# Patient Record
Sex: FEMALE | Race: WHITE | NOT HISPANIC OR LATINO | Employment: OTHER | URBAN - NONMETROPOLITAN AREA
[De-identification: names, ages, dates, MRNs, and addresses within clinical notes are randomized per-mention and may not be internally consistent; named-entity substitution may affect disease eponyms.]

---

## 2017-02-17 ENCOUNTER — HOSPITAL ENCOUNTER (INPATIENT)
Facility: HOSPITAL | Age: 67
LOS: 9 days | Discharge: HOME | End: 2017-02-26
Attending: INTERNAL MEDICINE | Admitting: INTERNAL MEDICINE

## 2017-02-17 PROBLEM — J44.1 ACUTE EXACERBATION OF CHRONIC OBSTRUCTIVE PULMONARY DISEASE (COPD) (HCC): Status: ACTIVE | Noted: 2017-02-17

## 2017-02-17 PROBLEM — F17.219 CIGARETTE NICOTINE DEPENDENCE WITH NICOTINE-INDUCED DISORDER: Status: ACTIVE | Noted: 2017-02-17

## 2017-02-17 PROBLEM — J96.02 ACUTE HYPERCAPNIC RESPIRATORY FAILURE (HCC): Status: ACTIVE | Noted: 2017-02-17

## 2017-02-17 PROBLEM — J96.21 ACUTE ON CHRONIC RESPIRATORY FAILURE WITH HYPOXIA (HCC): Status: ACTIVE | Noted: 2017-02-17

## 2017-02-17 PROBLEM — J18.9 PNEUMONIA OF RIGHT LOWER LOBE DUE TO INFECTIOUS ORGANISM: Status: ACTIVE | Noted: 2017-02-17

## 2017-02-17 LAB
ARTERIAL PATENCY WRIST A: POSITIVE
ATMOSPHERIC PRESS: 731 MMHG
BASE EXCESS BLDA CALC-SCNC: -3.8 MMOL/L
BDY SITE: ABNORMAL
CK MB SERPL-CCNC: 2.6 NG/ML (ref 0.2–2.4)
CK SERPL-CCNC: 32 U/L (ref 30–170)
FLUAV AG NPH QL: NEGATIVE
FLUBV AG NPH QL IA: NEGATIVE
HCO3 BLDA-SCNC: 21.1 MMOL/L (ref 22–28)
HGB BLDA-MCNC: 8.6 G/DL (ref 12–18)
HOROWITZ INDEX BLD+IHG-RTO: 50 %
MODALITY: ABNORMAL
PCO2 BLDA: 35 MM HG (ref 35–45)
PH BLDA: 7.39 PH UNITS
PO2 BLDA: 68.1 MM HG (ref 75–100)
SAO2 % BLDCOA: 95.1 %
TROPONIN I SERPL-MCNC: 0.05 NG/ML (ref 0–0.03)

## 2017-02-17 PROCEDURE — P9046 ALBUMIN (HUMAN), 25%, 20 ML: HCPCS | Performed by: INTERNAL MEDICINE

## 2017-02-17 PROCEDURE — 25010000002 ENOXAPARIN PER 10 MG: Performed by: INTERNAL MEDICINE

## 2017-02-17 PROCEDURE — 84484 ASSAY OF TROPONIN QUANT: CPT | Performed by: INTERNAL MEDICINE

## 2017-02-17 PROCEDURE — 82553 CREATINE MB FRACTION: CPT | Performed by: INTERNAL MEDICINE

## 2017-02-17 PROCEDURE — 87804 INFLUENZA ASSAY W/OPTIC: CPT | Performed by: FAMILY MEDICINE

## 2017-02-17 PROCEDURE — 5A09457 ASSISTANCE WITH RESPIRATORY VENTILATION, 24-96 CONSECUTIVE HOURS, CONTINUOUS POSITIVE AIRWAY PRESSURE: ICD-10-PCS | Performed by: INTERNAL MEDICINE

## 2017-02-17 PROCEDURE — 94799 UNLISTED PULMONARY SVC/PX: CPT

## 2017-02-17 PROCEDURE — 25010000002 METHYLPREDNISOLONE PER 40 MG: Performed by: INTERNAL MEDICINE

## 2017-02-17 PROCEDURE — 99223 1ST HOSP IP/OBS HIGH 75: CPT | Performed by: INTERNAL MEDICINE

## 2017-02-17 PROCEDURE — 87081 CULTURE SCREEN ONLY: CPT | Performed by: INTERNAL MEDICINE

## 2017-02-17 PROCEDURE — 25010000002 CEFTRIAXONE: Performed by: INTERNAL MEDICINE

## 2017-02-17 PROCEDURE — 36600 WITHDRAWAL OF ARTERIAL BLOOD: CPT

## 2017-02-17 PROCEDURE — 25010000002 AZITHROMYCIN: Performed by: INTERNAL MEDICINE

## 2017-02-17 PROCEDURE — 82805 BLOOD GASES W/O2 SATURATION: CPT

## 2017-02-17 PROCEDURE — 87040 BLOOD CULTURE FOR BACTERIA: CPT | Performed by: INTERNAL MEDICINE

## 2017-02-17 PROCEDURE — 82550 ASSAY OF CK (CPK): CPT | Performed by: INTERNAL MEDICINE

## 2017-02-17 PROCEDURE — 25010000002 DIGOXIN PER 500 MCG: Performed by: INTERNAL MEDICINE

## 2017-02-17 PROCEDURE — 94640 AIRWAY INHALATION TREATMENT: CPT

## 2017-02-17 PROCEDURE — 25010000002 ALBUMIN HUMAN 25% PER 50 ML: Performed by: INTERNAL MEDICINE

## 2017-02-17 PROCEDURE — 94660 CPAP INITIATION&MGMT: CPT

## 2017-02-17 PROCEDURE — 93005 ELECTROCARDIOGRAM TRACING: CPT | Performed by: INTERNAL MEDICINE

## 2017-02-17 RX ORDER — DOCUSATE SODIUM 100 MG/1
100 CAPSULE, LIQUID FILLED ORAL 2 TIMES DAILY
Status: DISCONTINUED | OUTPATIENT
Start: 2017-02-17 | End: 2017-02-26 | Stop reason: HOSPADM

## 2017-02-17 RX ORDER — IPRATROPIUM BROMIDE AND ALBUTEROL SULFATE 2.5; .5 MG/3ML; MG/3ML
3 SOLUTION RESPIRATORY (INHALATION)
Status: DISCONTINUED | OUTPATIENT
Start: 2017-02-17 | End: 2017-02-26 | Stop reason: HOSPADM

## 2017-02-17 RX ORDER — BUDESONIDE 0.5 MG/2ML
0.5 INHALANT ORAL
Status: DISCONTINUED | OUTPATIENT
Start: 2017-02-17 | End: 2017-02-18

## 2017-02-17 RX ORDER — ESCITALOPRAM OXALATE 20 MG/1
20 TABLET ORAL DAILY
Status: DISCONTINUED | OUTPATIENT
Start: 2017-02-17 | End: 2017-02-26 | Stop reason: HOSPADM

## 2017-02-17 RX ORDER — GUAIFENESIN 600 MG/1
600 TABLET, EXTENDED RELEASE ORAL 2 TIMES DAILY
Status: DISCONTINUED | OUTPATIENT
Start: 2017-02-17 | End: 2017-02-26 | Stop reason: HOSPADM

## 2017-02-17 RX ORDER — ACETAMINOPHEN 325 MG/1
650 TABLET ORAL EVERY 4 HOURS PRN
Status: DISCONTINUED | OUTPATIENT
Start: 2017-02-17 | End: 2017-02-26 | Stop reason: HOSPADM

## 2017-02-17 RX ORDER — ATENOLOL 50 MG/1
50 TABLET ORAL 2 TIMES DAILY
COMMUNITY

## 2017-02-17 RX ORDER — FLUTICASONE PROPIONATE 50 MCG
2 SPRAY, SUSPENSION (ML) NASAL DAILY
COMMUNITY

## 2017-02-17 RX ORDER — PRAVASTATIN SODIUM 40 MG
40 TABLET ORAL NIGHTLY
COMMUNITY

## 2017-02-17 RX ORDER — PRAVASTATIN SODIUM 20 MG
40 TABLET ORAL NIGHTLY
Status: DISCONTINUED | OUTPATIENT
Start: 2017-02-17 | End: 2017-02-26 | Stop reason: HOSPADM

## 2017-02-17 RX ORDER — DILTIAZEM HYDROCHLORIDE 5 MG/ML
INJECTION INTRAVENOUS
Status: COMPLETED
Start: 2017-02-17 | End: 2017-02-17

## 2017-02-17 RX ORDER — AMMONIUM LACTATE 12 G/100G
LOTION TOPICAL 2 TIMES DAILY PRN
Status: DISCONTINUED | OUTPATIENT
Start: 2017-02-17 | End: 2017-02-26 | Stop reason: HOSPADM

## 2017-02-17 RX ORDER — ECHINACEA PURPUREA EXTRACT 125 MG
1 TABLET ORAL AS NEEDED
Status: DISCONTINUED | OUTPATIENT
Start: 2017-02-17 | End: 2017-02-26 | Stop reason: HOSPADM

## 2017-02-17 RX ORDER — SENNOSIDES 8.6 MG
650 CAPSULE ORAL EVERY 8 HOURS PRN
COMMUNITY

## 2017-02-17 RX ORDER — ASPIRIN 81 MG/1
81 TABLET, CHEWABLE ORAL DAILY
Status: DISCONTINUED | OUTPATIENT
Start: 2017-02-17 | End: 2017-02-26 | Stop reason: HOSPADM

## 2017-02-17 RX ORDER — DIGOXIN 0.25 MG/ML
125 INJECTION INTRAMUSCULAR; INTRAVENOUS
Status: COMPLETED | OUTPATIENT
Start: 2017-02-17 | End: 2017-02-19

## 2017-02-17 RX ORDER — NICOTINE 21 MG/24HR
1 PATCH, TRANSDERMAL 24 HOURS TRANSDERMAL EVERY 24 HOURS
COMMUNITY

## 2017-02-17 RX ORDER — ONDANSETRON 2 MG/ML
4 INJECTION INTRAMUSCULAR; INTRAVENOUS EVERY 6 HOURS PRN
Status: DISCONTINUED | OUTPATIENT
Start: 2017-02-17 | End: 2017-02-26 | Stop reason: HOSPADM

## 2017-02-17 RX ORDER — NICOTINE 21 MG/24HR
1 PATCH, TRANSDERMAL 24 HOURS TRANSDERMAL EVERY 24 HOURS
Status: DISCONTINUED | OUTPATIENT
Start: 2017-02-17 | End: 2017-02-26 | Stop reason: HOSPADM

## 2017-02-17 RX ORDER — ATENOLOL 50 MG/1
50 TABLET ORAL 2 TIMES DAILY
Status: DISCONTINUED | OUTPATIENT
Start: 2017-02-17 | End: 2017-02-19

## 2017-02-17 RX ORDER — METHYLPREDNISOLONE SODIUM SUCCINATE 40 MG/ML
40 INJECTION, POWDER, LYOPHILIZED, FOR SOLUTION INTRAMUSCULAR; INTRAVENOUS EVERY 12 HOURS
Status: DISCONTINUED | OUTPATIENT
Start: 2017-02-17 | End: 2017-02-26 | Stop reason: HOSPADM

## 2017-02-17 RX ORDER — LORAZEPAM 0.5 MG/1
1 TABLET ORAL 2 TIMES DAILY
Status: DISCONTINUED | OUTPATIENT
Start: 2017-02-17 | End: 2017-02-26 | Stop reason: HOSPADM

## 2017-02-17 RX ORDER — DILTIAZEM HYDROCHLORIDE 5 MG/ML
10 INJECTION INTRAVENOUS ONCE
Status: DISCONTINUED | OUTPATIENT
Start: 2017-02-17 | End: 2017-02-19

## 2017-02-17 RX ORDER — LISINOPRIL 10 MG/1
10 TABLET ORAL DAILY
Status: DISCONTINUED | OUTPATIENT
Start: 2017-02-17 | End: 2017-02-26 | Stop reason: HOSPADM

## 2017-02-17 RX ORDER — ISOSORBIDE MONONITRATE 60 MG/1
60 TABLET, EXTENDED RELEASE ORAL DAILY
COMMUNITY
End: 2017-02-27

## 2017-02-17 RX ORDER — ONDANSETRON 4 MG/1
4 TABLET, ORALLY DISINTEGRATING ORAL EVERY 6 HOURS PRN
Status: DISCONTINUED | OUTPATIENT
Start: 2017-02-17 | End: 2017-02-26 | Stop reason: HOSPADM

## 2017-02-17 RX ORDER — LISINOPRIL 10 MG/1
10 TABLET ORAL DAILY
COMMUNITY

## 2017-02-17 RX ORDER — ONDANSETRON 4 MG/1
4 TABLET, FILM COATED ORAL EVERY 6 HOURS PRN
Status: DISCONTINUED | OUTPATIENT
Start: 2017-02-17 | End: 2017-02-26 | Stop reason: HOSPADM

## 2017-02-17 RX ORDER — IPRATROPIUM BROMIDE AND ALBUTEROL SULFATE 2.5; .5 MG/3ML; MG/3ML
3 SOLUTION RESPIRATORY (INHALATION) EVERY 4 HOURS PRN
Status: DISCONTINUED | OUTPATIENT
Start: 2017-02-17 | End: 2017-02-26 | Stop reason: HOSPADM

## 2017-02-17 RX ORDER — ALBUMIN (HUMAN) 12.5 G/50ML
12.5 SOLUTION INTRAVENOUS ONCE
Status: COMPLETED | OUTPATIENT
Start: 2017-02-17 | End: 2017-02-17

## 2017-02-17 RX ORDER — ESCITALOPRAM OXALATE 20 MG/1
20 TABLET ORAL DAILY
COMMUNITY

## 2017-02-17 RX ORDER — SODIUM CHLORIDE 0.9 % (FLUSH) 0.9 %
1-10 SYRINGE (ML) INJECTION AS NEEDED
Status: DISCONTINUED | OUTPATIENT
Start: 2017-02-17 | End: 2017-02-26 | Stop reason: HOSPADM

## 2017-02-17 RX ORDER — OMEPRAZOLE 20 MG/1
20 CAPSULE, DELAYED RELEASE ORAL DAILY
COMMUNITY

## 2017-02-17 RX ORDER — LORAZEPAM 1 MG/1
1 TABLET ORAL 2 TIMES DAILY
COMMUNITY
End: 2017-02-26 | Stop reason: HOSPADM

## 2017-02-17 RX ORDER — PANTOPRAZOLE SODIUM 40 MG/1
40 TABLET, DELAYED RELEASE ORAL
Status: DISCONTINUED | OUTPATIENT
Start: 2017-02-17 | End: 2017-02-26 | Stop reason: HOSPADM

## 2017-02-17 RX ORDER — BENZONATATE 100 MG/1
100 CAPSULE ORAL 3 TIMES DAILY PRN
Status: DISCONTINUED | OUTPATIENT
Start: 2017-02-17 | End: 2017-02-21

## 2017-02-17 RX ORDER — FLUTICASONE PROPIONATE 50 MCG
2 SPRAY, SUSPENSION (ML) NASAL DAILY
Status: DISCONTINUED | OUTPATIENT
Start: 2017-02-17 | End: 2017-02-26 | Stop reason: HOSPADM

## 2017-02-17 RX ORDER — BENZONATATE 100 MG/1
100 CAPSULE ORAL 3 TIMES DAILY PRN
COMMUNITY
End: 2017-02-27

## 2017-02-17 RX ORDER — IPRATROPIUM BROMIDE AND ALBUTEROL SULFATE 2.5; .5 MG/3ML; MG/3ML
3 SOLUTION RESPIRATORY (INHALATION) EVERY 4 HOURS PRN
COMMUNITY

## 2017-02-17 RX ORDER — DIGOXIN 0.25 MG/ML
125 INJECTION INTRAMUSCULAR; INTRAVENOUS ONCE
Status: COMPLETED | OUTPATIENT
Start: 2017-02-17 | End: 2017-02-17

## 2017-02-17 RX ORDER — ISOSORBIDE MONONITRATE 60 MG/1
60 TABLET, EXTENDED RELEASE ORAL DAILY
Status: DISCONTINUED | OUTPATIENT
Start: 2017-02-17 | End: 2017-02-18

## 2017-02-17 RX ORDER — ASPIRIN 81 MG/1
81 TABLET, CHEWABLE ORAL DAILY
COMMUNITY

## 2017-02-17 RX ORDER — SODIUM CHLORIDE 9 MG/ML
50 INJECTION, SOLUTION INTRAVENOUS CONTINUOUS
Status: DISCONTINUED | OUTPATIENT
Start: 2017-02-17 | End: 2017-02-20

## 2017-02-17 RX ADMIN — BUDESONIDE 0.5 MG: 0.5 INHALANT RESPIRATORY (INHALATION) at 21:54

## 2017-02-17 RX ADMIN — ALBUMIN HUMAN 12.5 G: 0.25 SOLUTION INTRAVENOUS at 16:04

## 2017-02-17 RX ADMIN — IPRATROPIUM BROMIDE AND ALBUTEROL SULFATE 3 ML: .5; 3 SOLUTION RESPIRATORY (INHALATION) at 18:06

## 2017-02-17 RX ADMIN — DILTIAZEM HYDROCHLORIDE 5 MG/HR: 5 INJECTION INTRAVENOUS at 13:37

## 2017-02-17 RX ADMIN — PRAVASTATIN SODIUM 40 MG: 20 TABLET ORAL at 21:01

## 2017-02-17 RX ADMIN — AZITHROMYCIN 500 MG: 500 INJECTION, POWDER, LYOPHILIZED, FOR SOLUTION INTRAVENOUS at 13:36

## 2017-02-17 RX ADMIN — METHYLPREDNISOLONE SODIUM SUCCINATE 40 MG: 40 INJECTION, POWDER, FOR SOLUTION INTRAMUSCULAR; INTRAVENOUS at 11:38

## 2017-02-17 RX ADMIN — FLUTICASONE PROPIONATE 2 SPRAY: 50 SPRAY, METERED NASAL at 11:41

## 2017-02-17 RX ADMIN — SODIUM CHLORIDE 100 ML/HR: 9 INJECTION, SOLUTION INTRAVENOUS at 11:02

## 2017-02-17 RX ADMIN — IPRATROPIUM BROMIDE AND ALBUTEROL SULFATE 3 ML: .5; 3 SOLUTION RESPIRATORY (INHALATION) at 21:55

## 2017-02-17 RX ADMIN — NICOTINE 1 PATCH: 14 PATCH TRANSDERMAL at 11:38

## 2017-02-17 RX ADMIN — GUAIFENESIN 600 MG: 600 TABLET, EXTENDED RELEASE ORAL at 11:38

## 2017-02-17 RX ADMIN — DOCUSATE SODIUM 100 MG: 100 CAPSULE, LIQUID FILLED ORAL at 17:12

## 2017-02-17 RX ADMIN — ATENOLOL 50 MG: 50 TABLET ORAL at 17:11

## 2017-02-17 RX ADMIN — ASPIRIN 81 MG 81 MG: 81 TABLET ORAL at 11:38

## 2017-02-17 RX ADMIN — ISOSORBIDE MONONITRATE 60 MG: 60 TABLET, EXTENDED RELEASE ORAL at 11:38

## 2017-02-17 RX ADMIN — DIGOXIN 125 MCG: 0.25 INJECTION INTRAMUSCULAR; INTRAVENOUS at 12:06

## 2017-02-17 RX ADMIN — DILTIAZEM HYDROCHLORIDE 30 MG: 30 TABLET, FILM COATED ORAL at 11:38

## 2017-02-17 RX ADMIN — ESCITALOPRAM OXALATE 20 MG: 20 TABLET ORAL at 11:38

## 2017-02-17 RX ADMIN — PANTOPRAZOLE SODIUM 40 MG: 40 TABLET, DELAYED RELEASE ORAL at 11:38

## 2017-02-17 RX ADMIN — DIGOXIN 125 MCG: 0.25 INJECTION INTRAMUSCULAR; INTRAVENOUS at 19:44

## 2017-02-17 RX ADMIN — GUAIFENESIN 600 MG: 600 TABLET, EXTENDED RELEASE ORAL at 17:12

## 2017-02-17 RX ADMIN — BUDESONIDE 0.5 MG: 0.5 INHALANT RESPIRATORY (INHALATION) at 18:06

## 2017-02-17 RX ADMIN — CEFTRIAXONE 1 G: 1 INJECTION, POWDER, FOR SOLUTION INTRAMUSCULAR; INTRAVENOUS at 11:41

## 2017-02-17 RX ADMIN — LORAZEPAM 1 MG: 0.5 TABLET ORAL at 17:12

## 2017-02-17 RX ADMIN — SALINE NASAL SPRAY 1 SPRAY: 1.5 SOLUTION NASAL at 21:06

## 2017-02-17 RX ADMIN — DILTIAZEM HYDROCHLORIDE 10 MG: 5 INJECTION INTRAVENOUS at 11:03

## 2017-02-17 RX ADMIN — ENOXAPARIN SODIUM 40 MG: 40 INJECTION SUBCUTANEOUS at 12:06

## 2017-02-17 NOTE — H&P
[facility] HOSPITALIST   HISTORY AND PHYSICAL      Name:  Elizabeth Fulton   Age:  66 y.o.  Sex:  female  :  1950  MRN:  [id number]   Visit Number:  [id number]  Admission Date:  2017  Date Of Service:  17  Primary Care Physician:  Young Nugent DO   Primary cardiologist: Dr. Goetz.    Chief Complaint:     Patient was transferred from [facility] with respiratory failure requiring BiPAP.    History Of Presenting Illness:      This is a 66-year-old female with history of COPD on home oxygen at 3 L, coronary artery disease, hypertension was transferred from [facility] emergency room with acute hypoxic and hypercapnic respiratory failure.  The history is obtained from the patient, her daughter as well as the medical chart.    The patient states that she was in her usual state of health until a few days ago when she started having increasing shortness of breath and cough.  She continued to use nebulizers and oxygen at home without any improvement.  Early this morning around 3 AM she got significantly worse and EMS was called.  Patient was taken to the emergency room after she received albuterol nebulization and IV Solu-Medrol by the EMS.  She was evaluated in the emergency room and her initial temperature was 99.6.  Blood pressure was 221/108 and pulse was 103.  She was saturating at 84% with a respiratory rate of 28.  Patient was given duo neb and IV Levaquin.  Her white count was noted to be 29,000.  Her troponin was negative.  Chest x-ray showed possible right lower lobe pneumonia.  ABG done in the emergency room showed hypercapnia with a PCO2 of 74.  She was placed on BiPAP with improvement in her CO2 levels.  She was given 1 L of normal saline.  Due to unavailability of ICU beds, she was transferred to [facility] intensive care unit.    Patient has history of COPD and has been on home oxygen for the last 2 years.  She has  duoneb at home and also uses Symbicort and Spiriva.  She does not follow-up with pulmonology.  She lives with her daughter and is independent in daily activities.  She does not use CPAP at home.    Patient is currently comfortable on BiPAP.  She does have history of paroxysmal atrial fibrillation but is not on any anticoagulation.  She was admitted to [facility] in December 2016 for COPD exacerbation and at that time she was told to have had a mild heart attack.  She was supposed to follow-up with Dr. Goetz which she has not been able to.    After about an hour being in the ICU, she developed atrial fibrillation with rapid ventricular rate with her heart rate in the 170s.  She was given IV Cardizem and has been started on oral Cardizem.    Review Of Systems:     General ROS: Patient denies any fevers, chills or loss of consciousness. Complains of generalized weakness.  Psychological ROS: No history of any hallucinations and delusions.  Ophthalmic ROS: No history of any diplopia or transient loss of vision.  ENT ROS: No history of sore throat, nasal congestion or ear pain.   Allergy and Immunology ROS: No history of rash or itching.  Hematological and Lymphatic ROS: No history of neck swelling or easy bleeding.  Endocrine ROS: No history of any recent unintentional weight gain or loss.  Respiratory ROS: As per history of present illness.  Cardiovascular ROS: No history of chest pain or palpitations.  Gastrointestinal ROS: No history of nausea and vomiting. Denies any abdominal pain. No diarrhea.  Genito-Urinary ROS: No history of dysuria or hematuria.  Musculoskeletal ROS: No muscle pain. No calf pain.   Neurological ROS: No history of any focal weakness. No loss of consciousness. Denies any numbness. Denies neck pain.  Dermatological ROS: No history of any redness or pruritis.  Complains of dry skin.     Past Medical History:    Past Medical History   Diagnosis Date   • Arthritis    • Cancer    • COPD  (chronic obstructive pulmonary disease)    • Emphysema of lung    • Hypertension    • Myocardial infarction    • Ulcer        Past Surgical history:    Past Surgical History   Procedure Laterality Date   • Colon surgery     • Tonsillectomy     • Tonsillectomy     • Gallbladder surgery     • Colonoscopy     • Endoscopy         Social History:    Social History     Social History   • Marital status:      Spouse name: N/A   • Number of children: N/A   • Years of education: N/A     Occupational History   • Not on file.     Social History Main Topics   • Smoking status: Current Every Day Smoker     Packs/day: 0.50     Years: 40.00     Types: Cigarettes   • Smokeless tobacco: Not on file      Comment: currently using patches   • Alcohol use No   • Drug use: No   • Sexual activity: No     Other Topics Concern   • Not on file     Social History Narrative   • No narrative on file       Family History:    History reviewed. No pertinent family history.    Allergies:      Review of patient's allergies indicates no known allergies.    Home Medications:    Prior to Admission Medications     Prescriptions Last Dose Informant Patient Reported? Taking?    acetaminophen (TYLENOL) 650 MG 8 hr tablet   Yes Yes    Take 650 mg by mouth Every 8 (Eight) Hours As Needed for mild pain (1-3).    aspirin 81 MG chewable tablet 2/16/2017  Yes Yes    Chew 81 mg Daily.    atenolol (TENORMIN) 50 MG tablet 2/16/2017  Yes Yes    Take 50 mg by mouth 2 (Two) Times a Day. Takes at morning and bedtime    benzonatate (TESSALON) 100 MG capsule 2/16/2017  Yes Yes    Take 100 mg by mouth 3 (Three) Times a Day As Needed for cough.    escitalopram (LEXAPRO) 20 MG tablet 2/16/2017  Yes Yes    Take 20 mg by mouth Daily.    fluticasone (FLONASE) 50 MCG/ACT nasal spray   Yes Yes    2 sprays into each nostril Daily.    ipratropium-albuterol (DUO-NEB) 0.5-2.5 mg/mL nebulizer 2/16/2017  Yes Yes    Take 3 mL by nebulization Every 4 (Four) Hours As Needed for  wheezing.    isosorbide mononitrate (IMDUR) 60 MG 24 hr tablet Past Week  Yes Yes    Take 60 mg by mouth Daily. Is out of this med needs refill    lisinopril (PRINIVIL,ZESTRIL) 10 MG tablet 2/16/2017  Yes Yes    Take 10 mg by mouth Daily. Noon    LORazepam (ATIVAN) 1 MG tablet 2/17/2017  Yes Yes    Take 1 mg by mouth 2 (Two) Times a Day.    nicotine (NICODERM CQ) 14 MG/24HR patch 2/16/2017  Yes Yes    Place 1 patch on the skin Daily.    omeprazole (priLOSEC) 20 MG capsule 2/16/2017  Yes Yes    Take 20 mg by mouth Daily.    pravastatin (PRAVACHOL) 40 MG tablet 2/16/2017  Yes Yes    Take 40 mg by mouth Every Night.             ED Medications:    Medications   aspirin chewable tablet 81 mg (not administered)   atenolol (TENORMIN) tablet 50 mg (not administered)   benzonatate (TESSALON) capsule 100 mg (not administered)   escitalopram (LEXAPRO) tablet 20 mg (not administered)   fluticasone (FLONASE) 50 MCG/ACT nasal spray 2 spray (not administered)   isosorbide mononitrate (IMDUR) 24 hr tablet 60 mg (not administered)   lisinopril (PRINIVIL,ZESTRIL) tablet 10 mg (not administered)   LORazepam (ATIVAN) tablet 1 mg (not administered)   nicotine (NICODERM CQ) 14 MG/24HR patch 1 patch (not administered)   pantoprazole (PROTONIX) EC tablet 40 mg (not administered)   pravastatin (PRAVACHOL) tablet 40 mg (not administered)   sodium chloride 0.9 % flush 1-10 mL (not administered)   acetaminophen (TYLENOL) tablet 650 mg (not administered)   ondansetron (ZOFRAN) tablet 4 mg (not administered)     Or   ondansetron ODT (ZOFRAN-ODT) disintegrating tablet 4 mg (not administered)     Or   ondansetron (ZOFRAN) injection 4 mg (not administered)   enoxaparin (LOVENOX) syringe 40 mg (not administered)   sodium chloride 0.9 % infusion (not administered)   docusate sodium (COLACE) capsule 100 mg (not administered)   cefTRIAXone (ROCEPHIN) 1 g/50 mL 0.9% NS VTB (mbp) (not administered)   budesonide (PULMICORT) nebulizer solution 0.5 mg (not  administered)   ipratropium-albuterol (DUO-NEB) nebulizer solution 3 mL (not administered)   ipratropium-albuterol (DUO-NEB) nebulizer solution 3 mL (not administered)   methylPREDNISolone sodium succinate (SOLU-Medrol) injection 40 mg (not administered)   AZITHROMYCIN 500 MG/250 ML 0.9% NS IVPB (vial-mate) (not administered)   guaiFENesin (MUCINEX) 12 hr tablet 600 mg (not administered)   diltiaZEM (CARDIZEM) injection 10 mg (not administered)   diltiaZEM (CARDIZEM) tablet 30 mg (not administered)       Vital Signs:    Temp:  [97.4 °F (36.3 °C)] 97.4 °F (36.3 °C)  Heart Rate:  [92] 92  Resp:  [29] 29  BP: (132)/(83) 132/83    Last 3 weights    02/17/17  0930   Weight: 93 lb 9 oz (42.4 kg)       Body mass index is 16.57 kg/(m^2).    Physical Exam:      General Appearance:  Alert and cooperative, in mild distress.   Head:  Atraumatic and normocephalic, without obvious abnormality.   Eyes:          PERRLA, conjunctivae and sclerae normal, no Icterus. No pallor. Extra-occular movements are within normal limits.   Ears:  Ears appear intact with no abnormalities noted.   Throat: No oral lesions, no thrush, oral mucosa moist.   Neck: Supple, trachea midline, no thyromegaly, no carotid bruit.   Back:   No kyphoscoliosis present. No tenderness to palpation,   range of motion normal.   Lungs:   Chest shape is barrel-shaped. Breath sounds heard bilaterally equally but decreased in the bases.  No crackles.  Bilateral wheezing hard. No Pleural rub or bronchial breathing.   Heart:  Normal S1 and S2, no murmur, no gallop, no rub. No JVD.   Abdomen:   Normal bowel sounds, no masses, no organomegaly. Soft     non-tender, non-distended, no guarding, no rebound tenderness.   Extremities: Moves all extremities well, no edema, no cyanosis, no clubbing.   Pulses: Pulses palpable and equal bilaterally.   Skin: No bleeding, bruising or rash. Dry skin noted throughout with scaling.     Neurologic: Alert and oriented x 3. Moves all four  limbs equally. No tremors. No facial asymetry. no asterixis.       Labs:  I have reviewed the labs done in the [facility] emergency room.  They are as follows:    Initial ABG showed pH of 7.23, PCO2 74, PO2 67 and bicarbonate of 30  Repeat ABG after BiPAP showed pH of 7.26, PCO2 58, PaO2 84, bicarbonate 25    Sodium 144, potassium 4.9, chloride 106, CO2 29, calcium 8.7, BUN 25, creatinine 0.8, alkaline phosphatase 129, , , total bilirubin 0.3, total protein 6.6, albumen 3.2, lactic acid 1.4  ProBNP 1555, troponin 0.01    WBC 29.7, hemoglobin 12.6, hematocrit 41.5, platelets 561.              Invalid input(s): LABALBU, PROT                                    Invalid input(s): USDES,  BLOODU, NITRITITE, BACT, EP  Pain Management Panel     There is no flowsheet data to display.              EKG:      EKG done in the emergency room was reviewed by me.  It shows sinus tachycardia 100 bpm.  Normal axis.  No significant ST-T changes were noted.    Radiology:    Imaging Results (last 72 hours)     ** No results found for the last 72 hours. **        Portable chest x-ray done in the emergency room at [facility] is reported as follows:    Impression:    Interval development of right basilar airspace disease which likely represents atelectasis or pneumonia.    Assessment:    1.  Acute hypercapnic respiratory failure, present on admission.  2.  Acute on chronic hypoxic respiratory failure, present on admission.  3.  Acute COPD exacerbation, present on admission.  4.  Right lower lobe bacterial pneumonia, community-acquired, present on admission.  5.  Acute atrial fibrillation with rapid ventricular rate.  6.  Essential hypertension.  7.  Coronary artery disease.  8.  Nicotine dependence.  9.  Hepatitis, uncertain etiology, possibly related to statin use.  10.  Moderate malnutrition with BMI of 17.    Plan:     Patient is currently admitted to the ICU.  We will continue her on BiPAP therapy.  She  will be placed on IV antibiotics with Rocephin and azithromycin.  She will be continued on oxygen, bronchodilator nebulization, budesonide, IV Solu-Medrol as well as mucolytic agents.    Patient's home medications will be continued.  She will be continued on Cardizem 30 mg every 6 hours.  I will consult Dr. Goetz for further recommendations.  She will be continued on nicotine patch.  Sputum culture will be obtained.  At this time I do not think she is positive for sepsis.    Patient states that she has a living will and wants to be DNR and this was confirmed by the daughter who is at the bedside.  I have discussed the patient's condition with her daughter who is at the bedside.  I strongly advised the patient to discontinue smoking.  According to the daughter she has significantly cut down.  At one point she was smoking 3 packs of cigarettes and is currently smoking less than half a pack a day.  Her prognosis is guarded and she is at high risk for worsening respiratory failure as well as myocardial infarction.    Shakeel Machado MD  02/17/17  10:46 AM

## 2017-02-17 NOTE — CONSULTS
Date of consultation:   February 17, 2017      Requested by:   Hospitalist Service.     PCP: Young Nugent DO    Reason:  Acute respiratory failure.  Pneumonia    History of Present Illness:  67-year-old female with known past medical history of COPD who went to a local ER and was transferred to our facility for further management.  During her evaluation in the ER she was found to have a pH of 7.23 with a PCO2 74.  She is also found to be hypertensive.  The patient had a chest x-ray performed which showed right-sided basilar airspace disease along with pleural effusion.  She was also found to be hypoxic with an O2 saturation 84%.  Her blood pressure was 221/108 and heart rate of 103.    The patient is currently on BiPAP and history is not available from her.    Review of System: Could not be obtained, as the patient is on BiPAP.     Past Medical History:  Past Medical History   Diagnosis Date   • Arthritis    • Cancer    • COPD (chronic obstructive pulmonary disease)    • Emphysema of lung    • Hypertension    • Myocardial infarction    • Ulcer          Past Surgical History:  Past Surgical History   Procedure Laterality Date   • Colon surgery     • Tonsillectomy     • Tonsillectomy     • Gallbladder surgery     • Colonoscopy     • Endoscopy           Family History:  History reviewed. No pertinent family history.      Social History:  Social History     Social History   • Marital status:      Spouse name: N/A   • Number of children: N/A   • Years of education: N/A     Social History Main Topics   • Smoking status: Current Every Day Smoker     Packs/day: 0.50     Years: 40.00     Types: Cigarettes   • Smokeless tobacco: None      Comment: currently using patches   • Alcohol use No   • Drug use: No   • Sexual activity: No     Other Topics Concern   • None     Social History Narrative   • None     Used to smoke 1-1/2 packs per day for about 40 years.  She is now smoking half pack per day      Physical  "Exam:  Visit Vitals   • /83 (BP Location: Left arm)   • Pulse 92   • Temp 97.4 °F (36.3 °C) (Axillary)   • Resp (!) 29   • Ht 63\" (160 cm)   • Wt 93 lb 9 oz (42.4 kg)   • SpO2 100%   • BMI 16.57 kg/m2     Constitutional:            Vital signs reviewed            Patient is currently on BiPAP    Head/Face/Eyes:            Pupils appeared equal and reactive to light.    ENT:             Patient was on the BiPAP.     Neck:             Supple. No JVD noted.     Cardiovascular:              S1 + S2. Irregular. Tachycardic.     Respiratory:            Transmitted breath sounds bilaterally with decreased air entry Right side.            Percussion could not be performed at this time.    Abdomen:            Soft.  Bowel sounds sluggishly positive. No obvious organomegaly noted.    Musculoskeletal/Extremities:             Gait could not be assessed at this time.              No clubbing in the upper extremities             No cyanosis noted in the upper extremities.             Normal edema noted in the lower extremities bilaterally.    Neurologic/Psychiatric:             Was able to follow simple commands              Exam was limited since the patient was on BiPAP.    Skin:             No obvious rash noted.             Warm and extremely dry.      Labs:   Reviewed. Pertinent labs were noted from transferring facility like.       Imaging Study: Chest X Ray report reviewed       Assessment:  1.  Acute hypercarbic respiratory failure  2.?  Pneumonia  3.  Likely severe COPD  4.  Continued smoking  5.  Atrial fibrillation with RVR  6.  Accelerated hypertension at the time of presentation    Discussion/Recommendations:   I have adjusted the BiPAP settings and will repeat ABG in a few hours.    I agree with antibiotics for now.  She clearly seems to be dehydrated and I agree with fluid boluses    The patient will be given digoxin, in view of RVR in the setting of hypotension.  I've asked the nursing staff to give her " 0.125 mg of digoxin IV push ×1    I'm not entirely clear if the patient has acute exacerbation of COPD but definitely seems to have very poor reserve and with accelerated hypertension and rapid ventricular rate, may have accumulated carbon dioxide from pulmonary edema?    I reviewed the orders with the nursing staff.    I have also discussed the case with the nursing staff.    Recommendations were also discussed with the referring provider.     I would like to thank you for the opportunity to participate in the care of this patient.  We will communicate changes and recommendations, if and when necessary.      Yvon Fam MD  02/17/17  11:25 AM

## 2017-02-17 NOTE — PROGRESS NOTES
Continued Stay Note  MANJULA Roberson     Patient Name: Elizabeth Fulton  MRN: [id number]  Today's Date: 2/17/2017    Admit Date: 2/17/2017          Discharge Plan       02/17/17 1441    Case Management/Social Work Plan    Plan Spoke to pt in room, was on continuous bipap.  Lives in one story house with daughter. Has a Walker, BSC but no other medical equipment.  Has been independent with all ADLS.  Has o2 though [employer] that she wears continuously at 2 liters. Does not have a cpap or bipap. States has HH also though [employer].   Plans to go home at discharge.  [facility] denies other discharge needs. CM will continue to follow.                Discharge Codes     None            Yesy Nielsen

## 2017-02-17 NOTE — CONSULTS
Joel Goetz MD  CARDIOLOGY CONSULT    PATIENT: Elizabeth Fulton                                                   DATE: 17   I06/  : 1950     PRIMARY PHYSICIAN: Young Nugent MD    CHIEF COMPLAINT: History of atrial fibrillation with rapid ventricular response, acute and chronic hypoxemic respiratory failure, borderline cardiac markers and accelerated hypertension    HISTORY OF PRESENT ILLNESS:  Patient is a 66-year-old  female with significant risk profile for atherosclerotic cardiovascular disease, comprising history of longstanding hypertension with hypertensive heart disease, dyslipidemia, and longstanding tobacco abuse, who unfortunately has rather advanced end-stage COPD with respiratory failure and early cor pulmonale, who was hospitalized on account of apparent COPD exacerbation with severe hypoxemic and hypercapnic respiratory failure with altered mental status and was eventually documented to have rather new onset atrial dysrhythmia with rapid ventricular response.    Patient who has moved from [place] to live with her children fortunately has had history of progressive functional decline for at least a year of so, secondary to progressive exertional dyspnea with rather frequent COPD exacerbation prompting multiple hospitalizations including a recent hospitalization in [facility] for more or less similar complaints. Patient has been on supplemental 02 for at least a year so with history of mild orthopnea, but no definitive PND. She denies dependent edema as well.    Patient, who appears to have several substrate for dysrhythmia, noticed occasional palpitation predominantly in the setting of emotional stress, with no definitive sustained dysrhythmia.    There is no prior history of scintigraphic or angiographic studies, denies any chest pain, pressure or tightness..    Patient with history of COPD as noted earlier and was hospitalized on account of COPD  exacerbation who has been on inhaled bronchodilator agents and supplemental O2 woke up apparently 3 AM with possible urinary incontinence. Patient was noticed to have altered mental status and she was very incoherent and became increasingly lethargic and poorly responsive.  She apparently did not have significant recent upper respiratory tract infection with no recent history of febrile illness of significant cough with sputum expectoration.  Patient appears to have had vertigo with apparent nausea and tinnitus but no significant headache etc.  She did not have any ear discharge as well.      Eventually EMTs were called and she was found to have severe acute hypoxemic and hypercapnic respiratory failure and addition was noticed to have markedly elevated blood pressure and atrial dysrhythmia prompting parenteral AV blocker therapy though she still has less than optimally controlled ventricular response    Patient has history of elevated cardiac markers but in general patient has not noticed any chest pain, pressure, or tightness. She is functionally limited, but there is no definitive prior history of effort-related angina as well.  Review of systems: constitutional:  Patient continues to lose weight and has been afebrile. HEENT: No history of nasal discharge/nasal obstruction or sore throat. CNS: No history of headache and no history of visual complaints, seizure disorder, or sensory or motor complaints. BRONCHOPULMONARY: Patient has no history of pleuritic chest pain or hemoptysis. GI: No history of nausea, vomiting, hematemesis, melena; No history of rectal bleeding : No history of nocturia, hematuria or dysuria; MUSCULOSKELETAL: Diffuse athralgias DERM: No history of skin rash. ENDO: No history of cold or heat intolerance or thyromegaly. HEMATOPOIETEC: No history of bleeding from any site, anemia or lymphadenopathy. PSYCH: No history depression or anxiety; SLEEP: Poor sleeping pattern    PAST MEDICAL HISTORY:      1. Atherosclerotic cardiovascular disease.  a. Peripheral arterial disease. Bilateral femoral bruits, especially right, with no definitive intermittent claudication.  b. Moderately high likelihood of underlying coronary artery disease based on risk profile and evidence of peripheral arterial disease with no prior scintigraphic or angiographic studies. On echocardiographic studies performed today, patient did not have any wall motion abnormalities.  c. Potential renovascular disease.  d. Potential mesenteric disease.  2. History of longstanding hypertension with hypertensive heart disease. Patient apparently has been hypertensive for several decades, but is not known to have definitive underlying renal parenchymal and renovascular disease. She has been on ACE inhibitor therapy and diuretic therapy.  3. Longstanding dyslipidemia.  4. History of advanced COPD with known respiratory failure, with suggestion of borderline RV function. Her RV function however was normal and right-sided filling pressures were normal as well.  Patient has previously undergone CT scan of the P protocol which was negative.  5. History of mild aortic sclerosis and mild mitral insufficiency.  6. Paroxysmal atrial dysrhythmia with no definitive known sustained dysrhythmias. Patient with anemia, has not been on antithrombotic therapy in the past.  7. Potential GI hemorrhage.  8. History of anxiety state and depression and history of prior colon malignancies status post chemotherapy with radiation.    PAST SURGICAL HISTORY:   1. Appendectomy.  2. Cholecystectomy.    SOCIAL HISTORY:  Patient had worked as a . She is retired and she is a . She has moved from [place] to live with her daughter in [place]. Patient continues to smoke possibly half pack to a pack per day.    FAMILY HISTORY: Not contributory    PHYSICAL EXAMINATION:  GENERAL: Pale-looking elderly female  Visit Vitals   • BP (!) 87/61   • Pulse 118   • Temp 97.6 °F  "(36.4 °C) (Axillary)   • Resp 17   • Ht 63\" (160 cm)   • Wt 93 lb 9 oz (42.4 kg)   • SpO2 100%   • BMI 16.57 kg/m2    Body mass index is 16.57 kg/(m^2). HEENT:  Head: Atraumatic, normocephalic, No tenderness over paranasal sinuses, external nares normal. No oral or nasal mucosal lesion. Sclera non-icteric ocular movements are normal with pupils reacting both to light and accommodations. Ocular fundi not seen. NECK: Carotid upstroke is normal, there are no carotid bruits, no JVD, no thyromegaly, no cervical or axillary lymphadenopathy. HEART: [place] beat is not displaced, no thrill is appreciated and both heart sounds are normal.  There is no murmur or rub audible. BRONCHOPULMONARY: Patient has markedly diminished air entry with bronchovesicular sounds. No adventious sounds audible. GI & : Soft, no tenderness elicited and no viscera are palpable. Bowel sounds are positive and there is no renal bruits audible. EXTREMITIES:  Distal vessels could not be palpated there are no femoral bruits audible. Patient does not have dependent edema. DERM: No skin rash. CNS: No gross motor neurological deficit. MUSCULOSKELETAL: No joint swelling notice and patient has normal range of motion in lower extremity.       Intake/Output Summary (Last 24 hours) at 02/17/17 1621  Last data filed at 02/17/17 1200   Gross per 24 hour   Intake      0 ml   Output    200 ml   Net   -200 ml     Wt Readings from Last 7 Encounters:   02/17/17 93 lb 9 oz (42.4 kg)     LABS:                     Results from last 7 days  Lab Units 02/17/17  1312   CK TOTAL U/L 32   TROPONIN I ng/mL 0.046*     No results found for: HGBA1C            RADIOLOGY:   Imaging Results (last 24 hours)     ** No results found for the last 24 hours. **          No Known Allergies    aspirin 81 mg Oral Daily   atenolol 50 mg Oral BID   azithromycin 500 mg Intravenous Q24H   budesonide 0.5 mg Nebulization BID - RT   ceftriaxone 1 g Intravenous Q24H   diltiaZEM 10 mg Intravenous Once "   docusate sodium 100 mg Oral BID   [START ON 2/18/2017] enoxaparin 40 mg Subcutaneous Q12H   escitalopram 20 mg Oral Daily   fluticasone 2 spray Nasal Daily   guaiFENesin 600 mg Oral BID   ipratropium-albuterol 3 mL Nebulization Q6H - RT   isosorbide mononitrate 60 mg Oral Daily   lisinopril 10 mg Oral Daily   LORazepam 1 mg Oral BID   methylPREDNISolone sodium succinate 40 mg Intravenous Q12H   nicotine 1 patch Transdermal Q24H   pantoprazole 40 mg Oral Q AM   pravastatin 40 mg Oral Nightly   sodium chloride 1,000 mL Intravenous Once        ASSESSMENT /PLAN:    1. Patient is 66 years old-year-old  female with significant risk profile for atherosclerotic cardiovascular disease outlined earlier, who appears to have rather advanced end-stage chronic obstructive pulmonary disease with frequent exacerbation and history of acute on chronic hypoxemic respiratory failure and early cor pulmonale, was hospitalized on account of apparent chronic obstructive pulmonary disease exacerbation and was noticed to have atrial fibrillation with rapid ventricular response prompting multiple AV blockers.  Patient does have have several substrates for atrial fibrillation with history of occasional palpitation in the past, but there is no definitive historic data suggesting sustained dysrhythmias.  Exact triggering mechanism not certain though COPD exacerbation with possible pneumonitis appears to be most likely underlying mechanism.  She does have prior history of possible melena but otherwise patient would be at moderate risk for intramural thrombus and cerebral thromboembolization.  Patient had lower blood pressure after introduction of diltiazem and was recommended food boluses.  Given lower blood pressure she may merit introduction of digoxin as further up titration of diltiazem or introduction of beta blocker therapy may not be feasible.  Chances of restoration of normal sinus rhythm are substantial and eventually  hopefully with improvement in the respiratory status, the risk for recurrent dysrhythmia would be minimal. At this stage, she would not merit antiarrhythmic or antithrombotic therapy, but she should be able to AV blocker therapy as discussed previously.  2. Patient, who has since converted potential for CAD, has had history of  borderline cardiac markers. In all likelihood, given suggestion of mild sepsis syndrome, it may reflect acute systemic inflammatory response versus RV strain as she does have prior history of borderline RV function. Understandably, on account of risk profile, especially given evidence of peripheral arterial disease, patient has moderate if not higher potential for underlying coronary artery disease in the absence of a new symptomatology. She would not necessarily merit definitive workup but as an outpatient, perfusion study would still be relevant. Patient after discontinuation of Cardizem if she does revert to normal sinus rhythm, would merit introduction of beta-blocker therapy and despite prior history of black stool, it would be desirable to maintain the patient on low-dose anti platelet therapy given substantial potential for cardiovascular morbidity and mortality. All these issues have been discussed with the patient in depth.  3. Patient appears to have diffuse atherosclerotic process.  Patient was noticed to have significant elevation of blood pressure, which may be driven by steroid therapy. She does appear to have low to moderate potential forrenovascular disease for which outpatient workup would be arranged. She also appears to have lost weight and case can be made for a mesenteric study as an outpatient as well.  4. Risk reduction. I had extensive discussion with the patient as well as with her extended family, which all appear to be smokers. Patient does not appear to be keen on smoking cessation .  Nonetheless patient would merit nicotine replacement therapy as an  outpatient.  5. Other issues noted include history of rather advanced chronic obstructive pulmonary disease with frequent exacerbation with acute on chronic hypoxemic respiratory failure with artery mental status secondary to hypercapnia for which patient under care of pulmonary services.  Patient also has history of history of chronic anxiety state.                     In closing, I sincerely appreciate opportunity to participate in care of this patient. If I can be of any further assistance with the management of this patient, please don’t hesitate to contact me.    DARRELL OROZCO M.D. [facility]

## 2017-02-18 ENCOUNTER — APPOINTMENT (OUTPATIENT)
Dept: GENERAL RADIOLOGY | Facility: HOSPITAL | Age: 67
End: 2017-02-18

## 2017-02-18 LAB
ALBUMIN SERPL-MCNC: 3.3 G/DL (ref 3.5–5)
ALBUMIN/GLOB SERPL: 1.5 G/DL (ref 1–2)
ALP SERPL-CCNC: 62 U/L (ref 38–126)
ALT SERPL W P-5'-P-CCNC: 113 U/L (ref 13–69)
ANION GAP SERPL CALCULATED.3IONS-SCNC: 9.7 MMOL/L
ARTERIAL PATENCY WRIST A: ABNORMAL
AST SERPL-CCNC: 71 U/L (ref 15–46)
ATMOSPHERIC PRESS: 0 MMHG
BACTERIA SPEC RESP CULT: NORMAL
BASE EXCESS BLDA CALC-SCNC: 0 MMOL/L
BASOPHILS # BLD AUTO: 0.02 10*3/MM3 (ref 0–0.2)
BASOPHILS NFR BLD AUTO: 0.2 % (ref 0–2.5)
BDY SITE: ABNORMAL
BILIRUB SERPL-MCNC: 0.3 MG/DL (ref 0.2–1.3)
BUN BLD-MCNC: 25 MG/DL (ref 7–20)
BUN/CREAT SERPL: 41.7 (ref 7.1–23.5)
CALCIUM SPEC-SCNC: 8.9 MG/DL (ref 8.4–10.2)
CHLORIDE SERPL-SCNC: 112 MMOL/L (ref 98–107)
CO2 SERPL-SCNC: 27 MMOL/L (ref 26–30)
CREAT BLD-MCNC: 0.6 MG/DL (ref 0.6–1.3)
DEPRECATED RDW RBC AUTO: 47.8 FL (ref 37–54)
EOSINOPHIL # BLD AUTO: 0 10*3/MM3 (ref 0–0.7)
EOSINOPHIL NFR BLD AUTO: 0 % (ref 0–7)
ERYTHROCYTE [DISTWIDTH] IN BLOOD BY AUTOMATED COUNT: 13.7 % (ref 11.5–14.5)
GFR SERPL CREATININE-BSD FRML MDRD: 99 ML/MIN/1.73
GLOBULIN UR ELPH-MCNC: 2.2 GM/DL
GLUCOSE BLD-MCNC: 118 MG/DL (ref 74–98)
GRAM STN SPEC: NORMAL
GRAM STN SPEC: NORMAL
HCO3 BLDA-SCNC: 0 MMOL/L (ref 22–28)
HCT VFR BLD AUTO: 28.8 % (ref 37–47)
HGB BLD-MCNC: 9.2 G/DL (ref 12–16)
HGB BLDA-MCNC: ABNORMAL G/DL (ref 12–18)
HOROWITZ INDEX BLD+IHG-RTO: ABNORMAL %
IMM GRANULOCYTES # BLD: 0.08 10*3/MM3 (ref 0–0.06)
IMM GRANULOCYTES NFR BLD: 1 % (ref 0–0.6)
LYMPHOCYTES # BLD AUTO: 0.8 10*3/MM3 (ref 0.6–3.4)
LYMPHOCYTES NFR BLD AUTO: 9.8 % (ref 10–50)
MAGNESIUM SERPL-MCNC: 1.6 MG/DL (ref 1.6–2.3)
MCH RBC QN AUTO: 30.3 PG (ref 27–31)
MCHC RBC AUTO-ENTMCNC: 31.9 G/DL (ref 30–37)
MCV RBC AUTO: 94.7 FL (ref 81–99)
MODALITY: ABNORMAL
MONOCYTES # BLD AUTO: 0.59 10*3/MM3 (ref 0–0.9)
MONOCYTES NFR BLD AUTO: 7.2 % (ref 0–12)
NEUTROPHILS # BLD AUTO: 6.71 10*3/MM3 (ref 2–6.9)
NEUTROPHILS NFR BLD AUTO: 81.8 % (ref 37–80)
NRBC BLD MANUAL-RTO: 0 /100 WBC (ref 0–0)
PCO2 BLDA: 0 MM HG (ref 35–45)
PH BLDA: 0 PH UNITS
PLATELET # BLD AUTO: 267 10*3/MM3 (ref 130–400)
PMV BLD AUTO: 11.8 FL (ref 6–12)
PO2 BLDA: 0 MM HG (ref 75–100)
POTASSIUM BLD-SCNC: 3.7 MMOL/L (ref 3.5–5.1)
PROT SERPL-MCNC: 5.5 G/DL (ref 6.3–8.2)
RBC # BLD AUTO: 3.04 10*6/MM3 (ref 4.2–5.4)
SAO2 % BLDCOA: ABNORMAL %
SODIUM BLD-SCNC: 145 MMOL/L (ref 137–145)
TROPONIN I SERPL-MCNC: 0.02 NG/ML (ref 0–0.03)
TSH SERPL DL<=0.05 MIU/L-ACNC: <0.015 MIU/ML (ref 0.47–4.68)
WBC NRBC COR # BLD: 8.2 10*3/MM3 (ref 4.8–10.8)

## 2017-02-18 PROCEDURE — 84443 ASSAY THYROID STIM HORMONE: CPT | Performed by: INTERNAL MEDICINE

## 2017-02-18 PROCEDURE — 83735 ASSAY OF MAGNESIUM: CPT | Performed by: INTERNAL MEDICINE

## 2017-02-18 PROCEDURE — 84484 ASSAY OF TROPONIN QUANT: CPT | Performed by: INTERNAL MEDICINE

## 2017-02-18 PROCEDURE — 93005 ELECTROCARDIOGRAM TRACING: CPT | Performed by: INTERNAL MEDICINE

## 2017-02-18 PROCEDURE — 25010000002 VANCOMYCIN PER 500 MG: Performed by: INTERNAL MEDICINE

## 2017-02-18 PROCEDURE — 82805 BLOOD GASES W/O2 SATURATION: CPT

## 2017-02-18 PROCEDURE — 36600 WITHDRAWAL OF ARTERIAL BLOOD: CPT

## 2017-02-18 PROCEDURE — 99232 SBSQ HOSP IP/OBS MODERATE 35: CPT | Performed by: INTERNAL MEDICINE

## 2017-02-18 PROCEDURE — 94799 UNLISTED PULMONARY SVC/PX: CPT

## 2017-02-18 PROCEDURE — 85025 COMPLETE CBC W/AUTO DIFF WBC: CPT | Performed by: INTERNAL MEDICINE

## 2017-02-18 PROCEDURE — 25010000002 DIGOXIN PER 500 MCG: Performed by: INTERNAL MEDICINE

## 2017-02-18 PROCEDURE — 25010000002 AZITHROMYCIN: Performed by: INTERNAL MEDICINE

## 2017-02-18 PROCEDURE — 94660 CPAP INITIATION&MGMT: CPT

## 2017-02-18 PROCEDURE — 80053 COMPREHEN METABOLIC PANEL: CPT | Performed by: INTERNAL MEDICINE

## 2017-02-18 PROCEDURE — 25010000002 ENOXAPARIN PER 10 MG: Performed by: INTERNAL MEDICINE

## 2017-02-18 PROCEDURE — 25010000002 CEFTRIAXONE: Performed by: INTERNAL MEDICINE

## 2017-02-18 PROCEDURE — 94640 AIRWAY INHALATION TREATMENT: CPT

## 2017-02-18 PROCEDURE — 87070 CULTURE OTHR SPECIMN AEROBIC: CPT | Performed by: INTERNAL MEDICINE

## 2017-02-18 PROCEDURE — 71010 HC CHEST PA OR AP: CPT

## 2017-02-18 PROCEDURE — 87205 SMEAR GRAM STAIN: CPT | Performed by: INTERNAL MEDICINE

## 2017-02-18 PROCEDURE — 25010000002 METHYLPREDNISOLONE PER 40 MG: Performed by: INTERNAL MEDICINE

## 2017-02-18 RX ORDER — LANOLIN ALCOHOL/MO/W.PET/CERES
5 CREAM (GRAM) TOPICAL NIGHTLY PRN
Status: DISCONTINUED | OUTPATIENT
Start: 2017-02-18 | End: 2017-02-21

## 2017-02-18 RX ORDER — WHITE PETROLATUM 450 MG/G
1 STICK TOPICAL AS NEEDED
Status: DISCONTINUED | OUTPATIENT
Start: 2017-02-18 | End: 2017-02-26 | Stop reason: HOSPADM

## 2017-02-18 RX ORDER — LORAZEPAM 0.5 MG/1
1 TABLET ORAL NIGHTLY PRN
Status: DISCONTINUED | OUTPATIENT
Start: 2017-02-18 | End: 2017-02-26 | Stop reason: HOSPADM

## 2017-02-18 RX ORDER — BUDESONIDE 0.5 MG/2ML
0.5 INHALANT ORAL
Status: DISCONTINUED | OUTPATIENT
Start: 2017-02-18 | End: 2017-02-26 | Stop reason: HOSPADM

## 2017-02-18 RX ADMIN — NICOTINE 1 PATCH: 14 PATCH TRANSDERMAL at 12:00

## 2017-02-18 RX ADMIN — Medication: at 05:49

## 2017-02-18 RX ADMIN — DOCUSATE SODIUM 100 MG: 100 CAPSULE, LIQUID FILLED ORAL at 17:07

## 2017-02-18 RX ADMIN — PANTOPRAZOLE SODIUM 40 MG: 40 TABLET, DELAYED RELEASE ORAL at 05:48

## 2017-02-18 RX ADMIN — BUDESONIDE 0.5 MG: 0.5 INHALANT RESPIRATORY (INHALATION) at 07:10

## 2017-02-18 RX ADMIN — LORAZEPAM 1 MG: 0.5 TABLET ORAL at 21:31

## 2017-02-18 RX ADMIN — CEFTRIAXONE 1 G: 1 INJECTION, POWDER, FOR SOLUTION INTRAMUSCULAR; INTRAVENOUS at 12:42

## 2017-02-18 RX ADMIN — IPRATROPIUM BROMIDE AND ALBUTEROL SULFATE 3 ML: .5; 3 SOLUTION RESPIRATORY (INHALATION) at 07:09

## 2017-02-18 RX ADMIN — LORAZEPAM 1 MG: 0.5 TABLET ORAL at 17:06

## 2017-02-18 RX ADMIN — AZITHROMYCIN 500 MG: 500 INJECTION, POWDER, LYOPHILIZED, FOR SOLUTION INTRAVENOUS at 12:42

## 2017-02-18 RX ADMIN — IPRATROPIUM BROMIDE AND ALBUTEROL SULFATE 3 ML: .5; 3 SOLUTION RESPIRATORY (INHALATION) at 21:55

## 2017-02-18 RX ADMIN — DOCUSATE SODIUM 100 MG: 100 CAPSULE, LIQUID FILLED ORAL at 09:30

## 2017-02-18 RX ADMIN — ATENOLOL 50 MG: 50 TABLET ORAL at 17:07

## 2017-02-18 RX ADMIN — ASPIRIN 81 MG 81 MG: 81 TABLET ORAL at 09:30

## 2017-02-18 RX ADMIN — DIGOXIN 125 MCG: 0.25 INJECTION INTRAMUSCULAR; INTRAVENOUS at 12:40

## 2017-02-18 RX ADMIN — IPRATROPIUM BROMIDE AND ALBUTEROL SULFATE 3 ML: .5; 3 SOLUTION RESPIRATORY (INHALATION) at 18:19

## 2017-02-18 RX ADMIN — IPRATROPIUM BROMIDE AND ALBUTEROL SULFATE 3 ML: .5; 3 SOLUTION RESPIRATORY (INHALATION) at 15:38

## 2017-02-18 RX ADMIN — ENOXAPARIN SODIUM 40 MG: 40 INJECTION SUBCUTANEOUS at 00:10

## 2017-02-18 RX ADMIN — ISOSORBIDE MONONITRATE 60 MG: 60 TABLET, EXTENDED RELEASE ORAL at 09:30

## 2017-02-18 RX ADMIN — LISINOPRIL 10 MG: 10 TABLET ORAL at 12:41

## 2017-02-18 RX ADMIN — IPRATROPIUM BROMIDE AND ALBUTEROL SULFATE 3 ML: .5; 3 SOLUTION RESPIRATORY (INHALATION) at 04:21

## 2017-02-18 RX ADMIN — GUAIFENESIN 600 MG: 600 TABLET, EXTENDED RELEASE ORAL at 09:30

## 2017-02-18 RX ADMIN — LORAZEPAM 1 MG: 0.5 TABLET ORAL at 05:10

## 2017-02-18 RX ADMIN — METHYLPREDNISOLONE SODIUM SUCCINATE 40 MG: 40 INJECTION, POWDER, FOR SOLUTION INTRAMUSCULAR; INTRAVENOUS at 12:41

## 2017-02-18 RX ADMIN — ESCITALOPRAM OXALATE 20 MG: 20 TABLET ORAL at 09:30

## 2017-02-18 RX ADMIN — PRAVASTATIN SODIUM 40 MG: 20 TABLET ORAL at 20:22

## 2017-02-18 RX ADMIN — IPRATROPIUM BROMIDE AND ALBUTEROL SULFATE 3 ML: .5; 3 SOLUTION RESPIRATORY (INHALATION) at 12:07

## 2017-02-18 RX ADMIN — BUDESONIDE 0.5 MG: 0.5 INHALANT RESPIRATORY (INHALATION) at 18:19

## 2017-02-18 RX ADMIN — ENOXAPARIN SODIUM 40 MG: 40 INJECTION SUBCUTANEOUS at 12:41

## 2017-02-18 RX ADMIN — VANCOMYCIN HYDROCHLORIDE 1000 MG: 1 INJECTION, POWDER, LYOPHILIZED, FOR SOLUTION INTRAVENOUS at 20:14

## 2017-02-18 RX ADMIN — GUAIFENESIN 600 MG: 600 TABLET, EXTENDED RELEASE ORAL at 17:07

## 2017-02-18 RX ADMIN — ATENOLOL 50 MG: 50 TABLET ORAL at 09:30

## 2017-02-18 RX ADMIN — Medication 1 APPLICATION: at 20:16

## 2017-02-18 NOTE — CONSULTS
Joel Goetz MD  CARDIOLOGY FOLLOW UP NOTE    PATIENT: Elizabeth Fulton                                                   DATE: 17   I06/1  : 1950     PRIMARY PHYSICIAN: Young Nugent MD    Reason for consult: History of atrial fibrillation with rapid ventricular response, acute and chronic hypoxemic respiratory failure, borderline cardiac markers and accelerated hypertension    Subjective: Patient has felt better in general and is fully awake and alert.  Patient has maintained normal sinus rhythm with occasional transient breakthrough atrial fibrillation.  She is on calcium tenderness therapy which she has tolerated fairly well.  Patient has not experienced any chest pain and she believes her shortness of air has stabilized as well.  Patient has history of cough with minimal if any sputum expectoration.  Her oral intake has improved to some degree.  Patient has not had any nausea or vomiting.    PAST MEDICAL HISTORY:     1. Atherosclerotic cardiovascular disease.  a. Peripheral arterial disease. Bilateral femoral bruits, especially right, with no definitive intermittent claudication.  b. Moderately high likelihood of underlying coronary artery disease based on risk profile and evidence of peripheral arterial disease with no prior scintigraphic or angiographic studies. On echocardiographic studies performed today, patient did not have any wall motion abnormalities.  c. Potential renovascular disease.  d. Potential mesenteric disease.  2. History of longstanding hypertension with hypertensive heart disease. Patient apparently has been hypertensive for several decades, but is not known to have definitive underlying renal parenchymal and renovascular disease. She has been on ACE inhibitor therapy and diuretic therapy.  3. Longstanding dyslipidemia.  4. History of advanced COPD with known respiratory failure, with suggestion of borderline RV function. Her RV function however was normal and  "right-sided filling pressures were normal as well.  Patient has previously undergone CT scan of the P protocol which was negative.  5. History of mild aortic sclerosis and mild mitral insufficiency.  6. Paroxysmal atrial dysrhythmia with no definitive known sustained dysrhythmias. Patient with anemia, has not been on antithrombotic therapy in the past.  7. Potential GI hemorrhage.  8. History of anxiety state and depression and history of prior colon malignancies status post chemotherapy with radiation.    PAST SURGICAL HISTORY:   1. Appendectomy.  2. Cholecystectomy.    SOCIAL HISTORY:  Patient had worked as a . She is retired and she is a . She has moved from [place] to live with her daughter in [place]. Patient continues to smoke possibly half pack to a pack per day.    FAMILY HISTORY: Not contributory    PHYSICAL EXAMINATION:  GENERAL: Pale-looking elderly female  Visit Vitals   • /77   • Pulse 95   • Temp 98.5 °F (36.9 °C) (Oral)   • Resp 24   • Ht 63\" (160 cm)   • Wt 102 lb (46.3 kg)   • SpO2 98%   • BMI 18.07 kg/m2    Body mass index is 18.07 kg/(m^2). HEENT:  Head: Atraumatic, normocephalic, No tenderness over paranasal sinuses, external nares normal. No oral or nasal mucosal lesion. Sclera non-icteric ocular movements are normal with pupils reacting both to light and accommodations. Ocular fundi not seen. NECK: Carotid upstroke is normal, there are no carotid bruits, no JVD, no thyromegaly, no cervical or axillary lymphadenopathy. HEART: [place] beat is not displaced, no thrill is appreciated and both heart sounds are normal.  There is no murmur or rub audible. BRONCHOPULMONARY: Patient has markedly diminished air entry with bronchovesicular sounds. No adventious sounds audible. GI & : Soft, no tenderness elicited and no viscera are palpable. Bowel sounds are positive and there is no renal bruits audible. EXTREMITIES:  Distal vessels could not be palpated there are no femoral bruits " audible. Patient does not have dependent edema. DERM: No skin rash. CNS: No gross motor neurological deficit. MUSCULOSKELETAL: No joint swelling notice and patient has normal range of motion in lower extremity.       Intake/Output Summary (Last 24 hours) at 02/18/17 1036  Last data filed at 02/18/17 0551   Gross per 24 hour   Intake   4168 ml   Output   1300 ml   Net   2868 ml     Wt Readings from Last 7 Encounters:   02/18/17 102 lb (46.3 kg)     LABS:     Results from last 7 days  Lab Units 02/18/17  0508   WBC 10*3/mm3 8.20   HEMOGLOBIN g/dL 9.2*   HEMATOCRIT % 28.8*   PLATELETS 10*3/mm3 267                     Results from last 7 days  Lab Units 02/18/17  0508 02/17/17  1312   CK TOTAL U/L  --  32   TROPONIN I ng/mL 0.018 0.046*     No results found for: HGBA1C    Results from last 7 days  Lab Units 02/18/17  0508   TSH mIU/mL <0.015*           RADIOLOGY:   Imaging Results (last 24 hours)     ** No results found for the last 24 hours. **          No Known Allergies    aspirin 81 mg Oral Daily   atenolol 50 mg Oral BID   azithromycin 500 mg Intravenous Q24H   budesonide 0.5 mg Nebulization BID - RT   ceftriaxone 1 g Intravenous Q24H   digoxin 125 mcg Intravenous Daily   diltiaZEM 10 mg Intravenous Once   docusate sodium 100 mg Oral BID   enoxaparin 40 mg Subcutaneous Q12H   escitalopram 20 mg Oral Daily   fluticasone 2 spray Nasal Daily   guaiFENesin 600 mg Oral BID   ipratropium-albuterol 3 mL Nebulization Q6H - RT   isosorbide mononitrate 60 mg Oral Daily   lisinopril 10 mg Oral Daily   LORazepam 1 mg Oral BID   methylPREDNISolone sodium succinate 40 mg Intravenous Q12H   nicotine 1 patch Transdermal Q24H   pantoprazole 40 mg Oral Q AM   pravastatin 40 mg Oral Nightly   sodium chloride 1,000 mL Intravenous Once        ASSESSMENT /PLAN:    1. Patient is 66 years old-year-old  female with significant risk profile for atherosclerotic cardiovascular disease outlined earlier, who appears to have rather  advanced end-stage chronic obstructive pulmonary disease with frequent exacerbation and history of acute on chronic hypoxemic respiratory failure and early cor pulmonale, who was hospitalized on account of apparent chronic obstructive pulmonary disease exacerbation and was noticed to have atrial fibrillation with rapid ventricular response prompting multiple AV blockers.  Patient does have have several substrates for atrial fibrillation with history of occasional palpitation in the past, but there is no definitive historic data suggesting sustained dysrhythmias.  COPD exacerbation with acute and chronic hypoxemic and hypercapnic respiratory failure appears to be major underlying mechanism of her recurrent dysrhythmia.  As noted earlier patient appears to have maintained sinus rhythm with occasional breakthrough dysrhythmia.  It would be desirable to continue the patient on AV blocker therapy though from tomorrow onward she can be switched to oral beta blocker therapy.  She does have prior history of possible melena but otherwise patient would be at moderate risk for intramural thrombus and cerebral thromboembolization.  Given underlying significant bronchodilator disease there is substantial risk for recurrent dysrhythmia though she would not merit antiarrhythmic therapy.  She was recommended to continue AV blocker therapy as discussed previously.  2. Patient, who has high likelihood of CAD, has had history of  borderline cardiac markers. In all likelihood, given suggestion of mild sepsis syndrome, it may reflect acute systemic inflammatory response versus RV strain as she does have prior history of borderline RV function. Understandably, on account of risk profile, especially given evidence of peripheral arterial disease, patient has moderate if not higher potential for underlying coronary artery disease in the absence of a new symptomatology. She would not necessarily merit invasive workup at this stage but as an  outpatient, perfusion study would still be relevant. Patient after discontinuation of Cardizem would merit introduction of beta-blocker therapy and despite prior history of black stool, it would be desirable to maintain the patient on low-dose anti platelet therapy given substantial potential for cardiovascular morbidity and mortality.  Patient would not necessarily merit long acting nitrates.  All these issues have been discussed with the patient in depth.  3. Patient appears to have diffuse atherosclerotic process.  Patient was noticed to have significant elevation of blood pressure, which may be driven by steroid therapy. She does appear to have low to moderate potential forrenovascular disease for which outpatient workup would be arranged.  Her blood pressure has been reasonably well controlled with the current regimen.  She also appears to have lost weight and case can be made for a mesenteric study as an outpatient as well.  4. Risk reduction. I had extensive discussion with the patient as well as with her extended family, which all appear to be smokers. Patient does not appear to be keen on smoking cessation .  Nonetheless patient would merit nicotine replacement therapy as an outpatient.  5. Other issues noted include history of rather advanced chronic obstructive pulmonary disease with frequent exacerbation with acute on chronic hypoxemic respiratory failure with altered mental status secondary to hypercapnia for which patient under care of pulmonary services.  Patient also has history of history of chronic anxiety state which she cites as major underlying reason that she has not been able to quit smoking.                     In closing, I sincerely appreciate opportunity to participate in care of this patient. If I can be of any further assistance with the management of this patient, please don’t hesitate to contact me.    DARRELL OROZCO M.D. [facility]

## 2017-02-18 NOTE — NURSING NOTE
notified of patient's anxiety and request for anxiety medication.  New order to give 0900 dose of Lorazepam early at this time.

## 2017-02-18 NOTE — PROGRESS NOTES
[facility]    Name:  Elizabeth Fulton   Age:  66 y.o.  Sex:  female  :  1950  MRN:  [id number]   Visit Number:  [id number]  Admission Date:  2017  Date Of Service:  17  Primary Care Physician:  Young Nugent DO     LOS: 1 day :  Patient Care Team:  Young Nugent DO as PCP - General (Family Medicine):    Chief Complaint:      Shortness of breath and generalized weakness.    Subjective / Interval History:     Patient is currently lying down on the bed and is on the BiPAP therapy.  She was seen by Dr. Goetz yesterday for her atrial fibrillation and rapid ventricular rate.  She has been on Cardizem drip since admission and her heart rate is fairly well controlled at this time.  She was also seen by Dr. Fam from pulmonology services.  She tolerated the BiPAP through the night and her oxygen saturations has significantly improved.  She also subjectively feels that her breathing has improved compared to yesterday.  She denies any chest pain.  A rapid flu test was negative.  Fortunately, her troponin has remained negative.  No fevers.    Review of Systems:     General ROS: Patient denies any fevers, chills or loss of consciousness.  Respiratory ROS: Complains of cough and shortness of breath.  Cardiovascular ROS: Denies chest pain or palpitations. No history of exertional chest pain.  Gastrointestinal ROS: Denies nausea and vomiting. Denies any abdominal pain. No diarrhea.  Neurological ROS: Denies any focal weakness. No loss of consciousness. Denies any numbness.   Dermatological ROS: Denies any redness or pruritis.    Vital Signs:    Temp:  [97.3 °F (36.3 °C)-98.5 °F (36.9 °C)] 98.5 °F (36.9 °C)  Heart Rate:  [] 78  Resp:  [16-30] 22  BP: ()/() 129/70    Intake and output:    I/O last 3 completed shifts:  In: 4168 [P.O.:360; I.V.:3808]  Out: 1300 [Urine:1300]       Physical Examination:    General Appearance:  Alert  and cooperative, not in any acute distress.   Head:  Atraumatic and normocephalic, without obvious abnormality.   Eyes:          PERRLA, conjunctivae and sclerae normal, no Icterus. No pallor. Extra-occular movements are within normal limits.   Neck: Supple, trachea midline, no thyromegaly, no carotid bruit.   Lungs:   Barrel-shaped chest. Breath sounds heard bilaterally equally but decreased in the bases.  No crackles.  Scattered wheezing heard. No Pleural rub or bronchial breathing.   Heart:  Normal S1 and S2, no murmur, no gallop, no rub. No JVD   Abdomen:   Normal bowel sounds, no masses, no organomegaly. Soft       non-tender, non-distended, no guarding, no rebound tenderness.   Extremities: Moves all extremities well, no edema, no cyanosis, no            clubbing.   Skin: No bleeding, bruising or rash.   Neurologic: Awake, alert and oriented times 3. Moves all 4 extremities equally.   Laboratory results:      Results from last 7 days  Lab Units 02/18/17  0508   SODIUM mmol/L 145   POTASSIUM mmol/L 3.7   CHLORIDE mmol/L 112*   TOTAL CO2 mmol/L 27.0   BUN mg/dL 25*   CREATININE mg/dL 0.60   CALCIUM mg/dL 8.9   BILIRUBIN mg/dL 0.3   ALK PHOS U/L 62   ALT (SGPT) U/L 113*   AST (SGOT) U/L 71*   GLUCOSE mg/dL 118*       Results from last 7 days  Lab Units 02/18/17  0508   WBC 10*3/mm3 8.20   HEMOGLOBIN g/dL 9.2*   HEMATOCRIT % 28.8*   PLATELETS 10*3/mm3 267           Results from last 7 days  Lab Units 02/18/17  0508 02/17/17  1312   CK TOTAL U/L  --  32   TROPONIN I ng/mL 0.018 0.046*       Results from last 7 days  Lab Units 02/17/17  1312 02/17/17  1311   BLOODCX  No growth at 24 hours No growth at 24 hours       I have reviewed the patient's laboratory results.    Radiology results:    Imaging Results (last 24 hours)     Procedure Component Value Units Date/Time    XR Chest 1 View [[id number]] Collected:  02/18/17 0830     Updated:  02/18/17 0834    Narrative:       PROCEDURE: XR CHEST 1 VW-     HISTORY:  Pneumonia.     COMPARISON: None.      FINDINGS:    . The heart is normal in size. The mediastinum is  unremarkable. The lungs are hyperinflated consistent with COPD. There  are mild right lung base opacities which may represent pneumonia or  atelectasis.. There is no pneumothorax. The bony thorax in intact.           Impression:       COPD.     Right lung base pneumonia or atelectasis..        This report was finalized on 2/18/2017 8:32 AM by Nestor Cardenas MD.          I have reviewed the patient's radiology reports.    Medication Review:     I have reviewed the patients active and prn medications.     Active Problems:    Acute exacerbation of chronic obstructive pulmonary disease (COPD)    Acute hypercapnic respiratory failure    Acute on chronic respiratory failure with hypoxia    Pneumonia of right lower lobe due to infectious organism    Cigarette nicotine dependence with nicotine-induced disorder      Assessment:    1. Acute hypercapnic respiratory failure, present on admission.  2. Acute on chronic hypoxic respiratory failure, present on admission.  3. Acute COPD exacerbation, present on admission.  4. Right lower lobe bacterial pneumonia, community-acquired, present on admission.  5. Acute atrial fibrillation with rapid ventricular rate.  6. Essential hypertension.  7. Coronary artery disease.  8. Nicotine dependence.  9. Hepatitis, uncertain etiology, possibly related to statin use.  10. Moderate malnutrition with BMI of 17.  11.  Low TSH levels (suspected hyperthyroidism).    Plan:    Patient is currently on Cardizem drip and will be continued on BiPAP as per the recommendation of Dr. Goetz.  She is on therapeutic Lovenox as well.  She will be continued on Rocephin and azithromycin for her right lower lobe pneumonia.  She has not even the sputum for culture.  She will be continued on oxygen, bronchodilator nebulization, budesonide, IV Solu-Medrol as well as mucolytic agents.  We will also continue the  BiPAP through the night and as needed during the day.  I have encouraged her to sit out of bed today.  Her liver functions have improved.  I have discussed the patient's condition with Dr. Dr. Goetz.  We will continue to monitor her in the ICU today and hopefully discontinue the Cardizem drip with oral Cardizem in the morning.    Patient does have low TSH levels.  She is not on any thyroid medications at home.  We will repeat the TSH and free T4 levels in the morning.    Shakeel Machado MD  02/18/17  2:34 PM

## 2017-02-18 NOTE — PLAN OF CARE
Problem: Patient Care Overview (Adult)  Goal: Plan of Care Review  Outcome: Ongoing (interventions implemented as appropriate)    02/18/17 1434   Coping/Psychosocial Response Interventions   Plan Of Care Reviewed With patient   Patient Care Overview   Progress no change         Problem: NPPV/CPAP (Adult)  Goal: Signs and Symptoms of Listed Potential Problems Will be Absent or Manageable (NPPV/CPAP)  Outcome: Ongoing (interventions implemented as appropriate)    02/18/17 1434   NPPV/CPAP   Problems Assessed (NPPV/CPAP) hypoxia/hypoxemia;skin breakdown   Problems Present (NPPV/CPAP) none

## 2017-02-18 NOTE — PLAN OF CARE
Problem: Patient Care Overview (Adult)  Goal: Plan of Care Review  Outcome: Ongoing (interventions implemented as appropriate)    02/17/17 2004   Coping/Psychosocial Response Interventions   Plan Of Care Reviewed With patient;daughter   Patient Care Overview   Progress no change       Goal: Adult Individualization and Mutuality  Outcome: Ongoing (interventions implemented as appropriate)    Problem: Pneumonia (Adult)  Goal: Signs and Symptoms of Listed Potential Problems Will be Absent or Manageable (Pneumonia)  Outcome: Ongoing (interventions implemented as appropriate)

## 2017-02-18 NOTE — PLAN OF CARE
Problem: Patient Care Overview (Adult)  Goal: Adult Individualization and Mutuality  Outcome: Ongoing (interventions implemented as appropriate)    02/17/17 0953 02/17/17 2004   Individualization   Patient Specific Preferences --  latrice mcghee   Mutuality/Individual Preferences   What Anxieties, Fears or Concerns Do You Have About Your Health or Care? anxious with soa --          Problem: Pneumonia (Adult)  Goal: Signs and Symptoms of Listed Potential Problems Will be Absent or Manageable (Pneumonia)  Outcome: Ongoing (interventions implemented as appropriate)    02/18/17 0435   Pneumonia   Problems Assessed (Pneumonia) all   Problems Present (Pneumonia) respiratory compromise

## 2017-02-18 NOTE — PLAN OF CARE
Problem: Patient Care Overview (Adult)  Goal: Plan of Care Review  Outcome: Ongoing (interventions implemented as appropriate)    02/18/17 9323   Coping/Psychosocial Response Interventions   Plan Of Care Reviewed With patient   Patient Care Overview   Progress improving       Goal: Adult Individualization and Mutuality  Outcome: Ongoing (interventions implemented as appropriate)  Goal: Discharge Needs Assessment  Outcome: Ongoing (interventions implemented as appropriate)    Problem: Pneumonia (Adult)  Goal: Signs and Symptoms of Listed Potential Problems Will be Absent or Manageable (Pneumonia)  Outcome: Ongoing (interventions implemented as appropriate)    Problem: NPPV/CPAP (Adult)  Goal: Signs and Symptoms of Listed Potential Problems Will be Absent or Manageable (NPPV/CPAP)  Outcome: Ongoing (interventions implemented as appropriate)

## 2017-02-19 ENCOUNTER — APPOINTMENT (OUTPATIENT)
Dept: CARDIOLOGY | Facility: HOSPITAL | Age: 67
End: 2017-02-19
Attending: INTERNAL MEDICINE

## 2017-02-19 LAB
ANION GAP SERPL CALCULATED.3IONS-SCNC: 9.5 MMOL/L
BASOPHILS # BLD AUTO: 0.01 10*3/MM3 (ref 0–0.2)
BASOPHILS NFR BLD AUTO: 0.1 % (ref 0–2.5)
BUN BLD-MCNC: 22 MG/DL (ref 7–20)
BUN/CREAT SERPL: 44 (ref 7.1–23.5)
CALCIUM SPEC-SCNC: 8.7 MG/DL (ref 8.4–10.2)
CHLORIDE SERPL-SCNC: 114 MMOL/L (ref 98–107)
CO2 SERPL-SCNC: 27 MMOL/L (ref 26–30)
CREAT BLD-MCNC: 0.5 MG/DL (ref 0.6–1.3)
DEPRECATED RDW RBC AUTO: 47.8 FL (ref 37–54)
EOSINOPHIL # BLD AUTO: 0 10*3/MM3 (ref 0–0.7)
EOSINOPHIL NFR BLD AUTO: 0 % (ref 0–7)
ERYTHROCYTE [DISTWIDTH] IN BLOOD BY AUTOMATED COUNT: 13.6 % (ref 11.5–14.5)
GFR SERPL CREATININE-BSD FRML MDRD: 123 ML/MIN/1.73
GLUCOSE BLD-MCNC: 144 MG/DL (ref 74–98)
HCT VFR BLD AUTO: 30 % (ref 37–47)
HGB BLD-MCNC: 9.3 G/DL (ref 12–16)
IMM GRANULOCYTES # BLD: 0.03 10*3/MM3 (ref 0–0.06)
IMM GRANULOCYTES NFR BLD: 0.4 % (ref 0–0.6)
LYMPHOCYTES # BLD AUTO: 0.36 10*3/MM3 (ref 0.6–3.4)
LYMPHOCYTES NFR BLD AUTO: 5.1 % (ref 10–50)
MCH RBC QN AUTO: 29.6 PG (ref 27–31)
MCHC RBC AUTO-ENTMCNC: 31 G/DL (ref 30–37)
MCV RBC AUTO: 95.5 FL (ref 81–99)
MONOCYTES # BLD AUTO: 0.17 10*3/MM3 (ref 0–0.9)
MONOCYTES NFR BLD AUTO: 2.4 % (ref 0–12)
MRSA SPEC QL CULT: NORMAL
NEUTROPHILS # BLD AUTO: 6.47 10*3/MM3 (ref 2–6.9)
NEUTROPHILS NFR BLD AUTO: 92 % (ref 37–80)
NRBC BLD MANUAL-RTO: 0 /100 WBC (ref 0–0)
PLATELET # BLD AUTO: 260 10*3/MM3 (ref 130–400)
PMV BLD AUTO: 11.6 FL (ref 6–12)
POTASSIUM BLD-SCNC: 3.5 MMOL/L (ref 3.5–5.1)
RBC # BLD AUTO: 3.14 10*6/MM3 (ref 4.2–5.4)
SODIUM BLD-SCNC: 147 MMOL/L (ref 137–145)
T4 FREE SERPL-MCNC: 1.61 NG/DL (ref 0.78–2.19)
TSH SERPL DL<=0.05 MIU/L-ACNC: <0.015 MIU/ML (ref 0.47–4.68)
WBC NRBC COR # BLD: 7.04 10*3/MM3 (ref 4.8–10.8)

## 2017-02-19 PROCEDURE — 84439 ASSAY OF FREE THYROXINE: CPT | Performed by: INTERNAL MEDICINE

## 2017-02-19 PROCEDURE — 99232 SBSQ HOSP IP/OBS MODERATE 35: CPT | Performed by: INTERNAL MEDICINE

## 2017-02-19 PROCEDURE — 25010000002 CEFTRIAXONE: Performed by: INTERNAL MEDICINE

## 2017-02-19 PROCEDURE — 25010000002 VANCOMYCIN PER 500 MG: Performed by: INTERNAL MEDICINE

## 2017-02-19 PROCEDURE — 25010000002 AZITHROMYCIN: Performed by: INTERNAL MEDICINE

## 2017-02-19 PROCEDURE — 93306 TTE W/DOPPLER COMPLETE: CPT

## 2017-02-19 PROCEDURE — 80048 BASIC METABOLIC PNL TOTAL CA: CPT | Performed by: INTERNAL MEDICINE

## 2017-02-19 PROCEDURE — 84443 ASSAY THYROID STIM HORMONE: CPT | Performed by: INTERNAL MEDICINE

## 2017-02-19 PROCEDURE — 25010000002 DIGOXIN PER 500 MCG: Performed by: INTERNAL MEDICINE

## 2017-02-19 PROCEDURE — 94799 UNLISTED PULMONARY SVC/PX: CPT

## 2017-02-19 PROCEDURE — 25010000002 HYDRALAZINE PER 20 MG: Performed by: FAMILY MEDICINE

## 2017-02-19 PROCEDURE — 94640 AIRWAY INHALATION TREATMENT: CPT

## 2017-02-19 PROCEDURE — 85025 COMPLETE CBC W/AUTO DIFF WBC: CPT | Performed by: INTERNAL MEDICINE

## 2017-02-19 PROCEDURE — 94660 CPAP INITIATION&MGMT: CPT

## 2017-02-19 PROCEDURE — 25010000002 ENOXAPARIN PER 10 MG: Performed by: INTERNAL MEDICINE

## 2017-02-19 PROCEDURE — 25010000002 METHYLPREDNISOLONE PER 40 MG: Performed by: INTERNAL MEDICINE

## 2017-02-19 RX ORDER — HYDRALAZINE HYDROCHLORIDE 20 MG/ML
10 INJECTION INTRAMUSCULAR; INTRAVENOUS EVERY 6 HOURS PRN
Status: DISCONTINUED | OUTPATIENT
Start: 2017-02-19 | End: 2017-02-26 | Stop reason: HOSPADM

## 2017-02-19 RX ORDER — ATENOLOL 50 MG/1
100 TABLET ORAL 2 TIMES DAILY
Status: DISCONTINUED | OUTPATIENT
Start: 2017-02-19 | End: 2017-02-26 | Stop reason: HOSPADM

## 2017-02-19 RX ORDER — AMLODIPINE BESYLATE 5 MG/1
5 TABLET ORAL
Status: DISCONTINUED | OUTPATIENT
Start: 2017-02-19 | End: 2017-02-20

## 2017-02-19 RX ADMIN — METHYLPREDNISOLONE SODIUM SUCCINATE 40 MG: 40 INJECTION, POWDER, FOR SOLUTION INTRAMUSCULAR; INTRAVENOUS at 01:19

## 2017-02-19 RX ADMIN — METHYLPREDNISOLONE SODIUM SUCCINATE 40 MG: 40 INJECTION, POWDER, FOR SOLUTION INTRAMUSCULAR; INTRAVENOUS at 12:27

## 2017-02-19 RX ADMIN — PANTOPRAZOLE SODIUM 40 MG: 40 TABLET, DELAYED RELEASE ORAL at 06:28

## 2017-02-19 RX ADMIN — LORAZEPAM 1 MG: 0.5 TABLET ORAL at 08:15

## 2017-02-19 RX ADMIN — ENOXAPARIN SODIUM 40 MG: 40 INJECTION SUBCUTANEOUS at 12:28

## 2017-02-19 RX ADMIN — ATENOLOL 100 MG: 50 TABLET ORAL at 17:32

## 2017-02-19 RX ADMIN — IPRATROPIUM BROMIDE AND ALBUTEROL SULFATE 3 ML: .5; 3 SOLUTION RESPIRATORY (INHALATION) at 07:38

## 2017-02-19 RX ADMIN — ATENOLOL 50 MG: 50 TABLET ORAL at 08:16

## 2017-02-19 RX ADMIN — IPRATROPIUM BROMIDE AND ALBUTEROL SULFATE 3 ML: .5; 3 SOLUTION RESPIRATORY (INHALATION) at 17:33

## 2017-02-19 RX ADMIN — Medication 1 EACH: at 22:49

## 2017-02-19 RX ADMIN — DIGOXIN 125 MCG: 0.25 INJECTION INTRAMUSCULAR; INTRAVENOUS at 12:27

## 2017-02-19 RX ADMIN — ENOXAPARIN SODIUM 40 MG: 40 INJECTION SUBCUTANEOUS at 01:19

## 2017-02-19 RX ADMIN — IPRATROPIUM BROMIDE AND ALBUTEROL SULFATE 3 ML: .5; 3 SOLUTION RESPIRATORY (INHALATION) at 22:35

## 2017-02-19 RX ADMIN — ASPIRIN 81 MG 81 MG: 81 TABLET ORAL at 08:15

## 2017-02-19 RX ADMIN — Medication: at 23:57

## 2017-02-19 RX ADMIN — FLUTICASONE PROPIONATE 2 SPRAY: 50 SPRAY, METERED NASAL at 09:44

## 2017-02-19 RX ADMIN — DILTIAZEM HYDROCHLORIDE 7.5 MG/HR: 5 INJECTION INTRAVENOUS at 01:18

## 2017-02-19 RX ADMIN — IPRATROPIUM BROMIDE AND ALBUTEROL SULFATE 3 ML: .5; 3 SOLUTION RESPIRATORY (INHALATION) at 14:16

## 2017-02-19 RX ADMIN — GUAIFENESIN 600 MG: 600 TABLET, EXTENDED RELEASE ORAL at 17:32

## 2017-02-19 RX ADMIN — BUDESONIDE 0.5 MG: 0.5 INHALANT RESPIRATORY (INHALATION) at 07:38

## 2017-02-19 RX ADMIN — VANCOMYCIN HYDROCHLORIDE 750 MG: 750 INJECTION, SOLUTION INTRAVENOUS at 20:18

## 2017-02-19 RX ADMIN — BUDESONIDE 0.5 MG: 0.5 INHALANT RESPIRATORY (INHALATION) at 19:34

## 2017-02-19 RX ADMIN — HYDRALAZINE HYDROCHLORIDE 20 MG: 20 INJECTION INTRAMUSCULAR; INTRAVENOUS at 20:45

## 2017-02-19 RX ADMIN — IPRATROPIUM BROMIDE AND ALBUTEROL SULFATE 3 ML: .5; 3 SOLUTION RESPIRATORY (INHALATION) at 04:29

## 2017-02-19 RX ADMIN — IPRATROPIUM BROMIDE AND ALBUTEROL SULFATE 3 ML: .5; 3 SOLUTION RESPIRATORY (INHALATION) at 01:08

## 2017-02-19 RX ADMIN — AMLODIPINE BESYLATE 5 MG: 5 TABLET ORAL at 20:44

## 2017-02-19 RX ADMIN — MELATONIN TAB 3 MG 4.5 MG: 3 TAB at 22:47

## 2017-02-19 RX ADMIN — LORAZEPAM 1 MG: 0.5 TABLET ORAL at 22:48

## 2017-02-19 RX ADMIN — METHYLPREDNISOLONE SODIUM SUCCINATE 40 MG: 40 INJECTION, POWDER, FOR SOLUTION INTRAMUSCULAR; INTRAVENOUS at 23:57

## 2017-02-19 RX ADMIN — IPRATROPIUM BROMIDE AND ALBUTEROL SULFATE 3 ML: .5; 3 SOLUTION RESPIRATORY (INHALATION) at 19:34

## 2017-02-19 RX ADMIN — ENOXAPARIN SODIUM 40 MG: 40 INJECTION SUBCUTANEOUS at 23:57

## 2017-02-19 RX ADMIN — NICOTINE 1 PATCH: 14 PATCH TRANSDERMAL at 12:31

## 2017-02-19 RX ADMIN — GUAIFENESIN 600 MG: 600 TABLET, EXTENDED RELEASE ORAL at 08:16

## 2017-02-19 RX ADMIN — DOCUSATE SODIUM 100 MG: 100 CAPSULE, LIQUID FILLED ORAL at 17:32

## 2017-02-19 RX ADMIN — SODIUM CHLORIDE 100 ML/HR: 9 INJECTION, SOLUTION INTRAVENOUS at 10:10

## 2017-02-19 RX ADMIN — LISINOPRIL 10 MG: 10 TABLET ORAL at 12:27

## 2017-02-19 RX ADMIN — CEFTRIAXONE 1 G: 1 INJECTION, POWDER, FOR SOLUTION INTRAMUSCULAR; INTRAVENOUS at 12:27

## 2017-02-19 RX ADMIN — AZITHROMYCIN 500 MG: 500 INJECTION, POWDER, LYOPHILIZED, FOR SOLUTION INTRAVENOUS at 13:00

## 2017-02-19 RX ADMIN — Medication 10 ML: at 22:49

## 2017-02-19 RX ADMIN — PRAVASTATIN SODIUM 40 MG: 20 TABLET ORAL at 20:44

## 2017-02-19 RX ADMIN — LORAZEPAM 1 MG: 0.5 TABLET ORAL at 17:32

## 2017-02-19 RX ADMIN — DOCUSATE SODIUM 100 MG: 100 CAPSULE, LIQUID FILLED ORAL at 08:16

## 2017-02-19 RX ADMIN — ESCITALOPRAM OXALATE 20 MG: 20 TABLET ORAL at 08:15

## 2017-02-19 NOTE — NURSING NOTE
1115 Just spoke w/ Dr Goetz.  He did not order po dose for cardizem.  He decided to change dose on atenolol.  Pharm did not dose that until 1800 and he stated that was fine with him and that it was ok to dc Cardizem IV at this time.  Pt is sleeping and not having any distress.

## 2017-02-19 NOTE — CONSULTS
Joel Goetz MD  CARDIOLOGY FOLLOW UP NOTE    PATIENT: Elizabeth Fulton                                                   DATE: 17   I06/1  : 1950     PRIMARY PHYSICIAN: Young Nugent MD    Reason for consult: History of atrial fibrillation with rapid ventricular response, acute and chronic hypoxemic respiratory failure, borderline cardiac markers and accelerated hypertension    Subjective: Patient has felt better in general and is fully awake and alert.  Patient has maintained normal sinus rhythm without recurrence of breakthrough atrial fibrillation.  Patient has been on calcium antagonist therapy and to beta blocker therapy patient apparently used positive pressure breathing only for a couple of hours.  Patient has not experienced any chest pain and she believes her shortness of air has stabilized as well.  Patient has history of cough with minimal if any sputum expectoration.  Her oral intake has improved to some degree with no associated nausea or vomiting.    PAST MEDICAL HISTORY:     1. Atherosclerotic cardiovascular disease.  a. Peripheral arterial disease. Bilateral femoral bruits, especially right, with no definitive intermittent claudication.  b. Moderately high likelihood of underlying coronary artery disease based on risk profile and evidence of peripheral arterial disease with no prior scintigraphic or angiographic studies. On echocardiographic studies performed today, patient did not have any wall motion abnormalities.  c. Potential renovascular disease.  d. Potential mesenteric disease.  2. History of longstanding hypertension with hypertensive heart disease. Patient apparently has been hypertensive for several decades, but is not known to have definitive underlying renal parenchymal and renovascular disease. She has been on ACE inhibitor therapy and diuretic therapy.  3. Longstanding dyslipidemia.  4. History of advanced COPD with known respiratory failure, with suggestion  "of borderline RV function. Her RV function however was normal and right-sided filling pressures were normal as well.  Patient has previously undergone CT scan of the P protocol which was negative.  5. History of mild aortic sclerosis and mild mitral insufficiency.  6. Paroxysmal atrial dysrhythmia with no definitive known sustained dysrhythmias. Patient with anemia, has not been on antithrombotic therapy in the past.  7. Potential GI hemorrhage.  8. History of anxiety state and depression and history of prior colon malignancies status post chemotherapy with radiation.    PAST SURGICAL HISTORY:   1. Appendectomy.  2. Cholecystectomy.    SOCIAL HISTORY:  Patient had worked as a . She is retired and she is a . She has moved from [place] to live with her daughter in [place]. Patient continues to smoke possibly half pack to a pack per day.    FAMILY HISTORY: Not contributory    PHYSICAL EXAMINATION:  GENERAL: Pale-looking elderly female  Visit Vitals   • /88   • Pulse 82   • Temp 99.1 °F (37.3 °C) (Oral)   • Resp 16   • Ht 63\" (160 cm)   • Wt 108 lb 3.2 oz (49.1 kg)   • SpO2 95%   • BMI 19.17 kg/m2    Body mass index is 19.17 kg/(m^2). HEENT:  Head: Atraumatic, normocephalic, No tenderness over paranasal sinuses, external nares normal. No oral or nasal mucosal lesion. Sclera non-icteric ocular movements are normal with pupils reacting both to light and accommodations. Ocular fundi not seen. NECK: Carotid upstroke is normal, there are no carotid bruits, no JVD, no thyromegaly, no cervical or axillary lymphadenopathy. HEART: [place] beat is not displaced, no thrill is appreciated and both heart sounds are normal.  There is no murmur or rub audible. BRONCHOPULMONARY: Patient has markedly diminished air entry with bronchovesicular sounds. No adventious sounds audible. GI & : Soft, no tenderness elicited and no viscera are palpable. Bowel sounds are positive and there is no renal bruits audible. " EXTREMITIES:  Distal vessels could not be palpated there are no femoral bruits audible. Patient does not have dependent edema. DERM: No skin rash. CNS: No gross motor neurological deficit. MUSCULOSKELETAL: No joint swelling notice and patient has normal range of motion in lower extremity.       Intake/Output Summary (Last 24 hours) at 02/19/17 1015  Last data filed at 02/19/17 1011   Gross per 24 hour   Intake   2748 ml   Output   1150 ml   Net   1598 ml     Wt Readings from Last 7 Encounters:   02/19/17 108 lb 3.2 oz (49.1 kg)     LABS:     Results from last 7 days  Lab Units 02/19/17  0430 02/18/17  0508   WBC 10*3/mm3 7.04 8.20   HEMOGLOBIN g/dL 9.3* 9.2*   HEMATOCRIT % 30.0* 28.8*   PLATELETS 10*3/mm3 260 267                     Results from last 7 days  Lab Units 02/18/17  0508 02/17/17  1312   CK TOTAL U/L  --  32   TROPONIN I ng/mL 0.018 0.046*     No results found for: HGBA1C    Results from last 7 days  Lab Units 02/19/17  0431   TSH mIU/mL <0.015*   FREE T4 ng/dL 1.61           RADIOLOGY:   Imaging Results (last 24 hours)     ** No results found for the last 24 hours. **          No Known Allergies    aspirin 81 mg Oral Daily   atenolol 100 mg Oral BID   azithromycin 500 mg Intravenous Q24H   budesonide 0.5 mg Nebulization BID - RT   ceftriaxone 1 g Intravenous Q24H   digoxin 125 mcg Intravenous Daily   docusate sodium 100 mg Oral BID   enoxaparin 40 mg Subcutaneous Q12H   escitalopram 20 mg Oral Daily   fluticasone 2 spray Nasal Daily   guaiFENesin 600 mg Oral BID   ipratropium-albuterol 3 mL Nebulization Q6H - RT   lisinopril 10 mg Oral Daily   LORazepam 1 mg Oral BID   methylPREDNISolone sodium succinate 40 mg Intravenous Q12H   nicotine 1 patch Transdermal Q24H   pantoprazole 40 mg Oral Q AM   pravastatin 40 mg Oral Nightly   sodium chloride 1,000 mL Intravenous Once   vancomycin 750 mg Intravenous Q24H        ASSESSMENT /PLAN:    1. Patient is 66 years old-year-old  female with significant  risk profile for atherosclerotic cardiovascular disease outlined earlier, who appears to have rather advanced end-stage chronic obstructive pulmonary disease with frequent exacerbation and history of acute on chronic hypoxemic respiratory failure and early cor pulmonale, who was hospitalized on account of apparent chronic obstructive pulmonary disease exacerbation and was noticed to have atrial fibrillation with rapid ventricular response prompting multiple AV blockers.  Patient has reverted to normal sinus rhythm and has not required antiarrhythmic therapy.  Patient has not been started on antithrombotic therapy given prior history of melena.  Patient does have have several substrates for atrial fibrillation with history of occasional palpitation in the past, but there is no definitive historic data suggesting sustained dysrhythmias prior to current hospitalization and she would not necessarily merit antiarrhythmic therapy.  COPD exacerbation with acute and chronic hypoxemic and hypercapnic respiratory failure appears to be major underlying mechanism of her recurrent dysrhythmia.  As noted earlier, patient appears to have maintained sinus rhythm without any breakthrough dysrhythmia.  It would be desirable to continue the patient on AV blocker therapy preferably as beta blocker therapy and up titrated dosages given potential underlying CAD.    2. Patient, who has high likelihood of CAD, has had history of  borderline cardiac markers. In all likelihood, given suggestion of mild sepsis syndrome, it may reflect acute systemic inflammatory response versus RV strain as she does have prior history of borderline RV function. Understandably, on account of risk profile, especially given evidence of peripheral arterial disease, patient has moderate if not higher potential for underlying coronary artery disease in the absence of a new symptomatology. She would not necessarily merit invasive workup at this stage but as an  outpatient, perfusion study would still be relevant.  Patient would merit hydrostatically for potential for cardiovascular morbidity and mortality.  Patient would not necessarily merit long acting nitrates which has been discontinued.  All these issues have been discussed with the patient in depth.  3. Patient appears to have diffuse atherosclerotic process.  Patient was noticed to have significant elevation of blood pressure, which may be driven by steroid therapy. She does appear to have low to moderate potential forrenovascular disease for which outpatient workup would be arranged.  Her blood pressure has been reasonably well controlled with the current regimen.  She also appears to have lost weight and case can be made for a mesenteric study as an outpatient as well.  4. Risk reduction. I had extensive discussion with the patient as well as with her extended family, which all appear to be smokers. Patient does not appear to be keen on smoking cessation .  Nonetheless patient would merit nicotine replacement therapy as an outpatient.  Apparently patient has been on Chantix in the past.  5. Other issues noted include history of rather advanced chronic obstructive pulmonary disease with frequent exacerbation with acute on chronic hypoxemic respiratory failure with altered mental status secondary to hypercapnia for which patient under care of pulmonary services.  Patient has been fully oriented today.  Patient also has history of history of chronic anxiety state which she cites as major underlying reason that she has not been able to quit smoking.                     In closing, I sincerely appreciate opportunity to participate in care of this patient. If I can be of any further assistance with the management of this patient, please don’t hesitate to contact me.    DARRELL OROZCO M.D. [facility]

## 2017-02-19 NOTE — PLAN OF CARE
Problem: NPPV/CPAP (Adult)  Intervention: Monitor/Manage Anxiety Related to NPPV/CPAP    02/19/17 0109   Coping Strategies   Trust Relationship/Rapport care explained;empathic listening provided       Intervention: Prevent Aspiration During Therapy    02/19/17 0109   Positioning   Head Of Bed (HOB) Position HOB at 30-45 degrees         Goal: Signs and Symptoms of Listed Potential Problems Will be Absent or Manageable (NPPV/CPAP)  Outcome: Ongoing (interventions implemented as appropriate)    02/19/17 0109   NPPV/CPAP   Problems Assessed (NPPV/CPAP) situational response;hypoxia/hypoxemia;skin breakdown;dry mucous membranes   Problems Present (NPPV/CPAP) none

## 2017-02-19 NOTE — PLAN OF CARE
Problem: Patient Care Overview (Adult)  Goal: Plan of Care Review  Outcome: Ongoing (interventions implemented as appropriate)  Goal: Adult Individualization and Mutuality  Outcome: Ongoing (interventions implemented as appropriate)    02/19/17 0436   Individualization   Patient Specific Goals wants a bipap at home         Problem: Pneumonia (Adult)  Goal: Signs and Symptoms of Listed Potential Problems Will be Absent or Manageable (Pneumonia)  Outcome: Ongoing (interventions implemented as appropriate)    02/19/17 0436   Pneumonia   Problems Assessed (Pneumonia) all   Problems Present (Pneumonia) respiratory compromise         Problem: NPPV/CPAP (Adult)  Goal: Signs and Symptoms of Listed Potential Problems Will be Absent or Manageable (NPPV/CPAP)  Outcome: Ongoing (interventions implemented as appropriate)    02/19/17 0436   NPPV/CPAP   Problems Assessed (NPPV/CPAP) all   Problems Present (NPPV/CPAP) dry mucous membranes

## 2017-02-19 NOTE — PROGRESS NOTES
Nursing called that outside hospital called with   Anaerobic blood cultures positive for gram positive cocci in clusters with final results pending   Vancomycin added pharmacy to dose.    Also, patient anxious more than usual and having difficulty sleeping. Medication risks and benefits discussed. MARÍA pending. Ok to give extra one time Lorazepam and add melatonin as needed. Patient tolerating bipap last night and agreed to retry tonight.

## 2017-02-19 NOTE — PROGRESS NOTES
[facility]    Name:  Elizabeth Fulton   Age:  66 y.o.  Sex:  female  :  1950  MRN:  [id number]   Visit Number:  [id number]  Admission Date:  2017  Date Of Service:  17  Primary Care Physician:  Young Nugent DO     LOS: 2 days :  Patient Care Team:  Young Nugent DO as PCP - General (Family Medicine):    Chief Complaint:      Shortness of breath and generalized weakness.  She also complains of anxiety.    Subjective / Interval History:     Patient is currently sitting up on the bed and is on the BiPAP therapy.  She was complaining of increasing anxiety and was started on Ativan which is her home dose.  She also received an extra dose yesterday.  She was eating breakfast this morning but felt anxious and short of breath and has herself requested to be back on BiPAP therapy.  She is currently sitting comfortably with BiPAP therapy and saturating at 100%.  She is on Cardizem drip and her heart rate is also stable.  No significant overnight events.    Patient was seen by Dr. Goetz this morning.  He has recommended to discontinue the Cardizem drip and start her on atenolol from this evening.    Review of Systems:     General ROS: Patient denies any fevers, chills or loss of consciousness.  Respiratory ROS: Complains of cough and shortness of breath.  Cardiovascular ROS: Denies chest pain or palpitations. No history of exertional chest pain.  Gastrointestinal ROS: Denies nausea and vomiting. Denies any abdominal pain. No diarrhea.  Neurological ROS: Denies any focal weakness. No loss of consciousness.  Complaints of anxiety.   Dermatological ROS: Denies any redness or pruritis.    Vital Signs:    Temp:  [96.3 °F (35.7 °C)-99.1 °F (37.3 °C)] 97.3 °F (36.3 °C)  Heart Rate:  [71-86] 79  Resp:  [16-23] 18  BP: (118-174)/() 147/87    Intake and output:    I/O last 3 completed shifts:  In: 3817 [I.V.:3817]  Out: 1150 [Urine:1150]  I/O  this shift:  In: 200 [P.O.:200]  Out: 650 [Urine:650]    Physical Examination:    General Appearance:  Alert and cooperative, not in any acute distress.   Head:  Atraumatic and normocephalic, without obvious abnormality.   Eyes:          PERRLA, conjunctivae and sclerae normal, no Icterus. No pallor. Extra-occular movements are within normal limits.   Neck: Supple, trachea midline, no thyromegaly, no carotid bruit.   Lungs:   Barrel-shaped chest. Breath sounds heard bilaterally equally but decreased in the bases.  No crackles.  Scattered wheezing heard. No Pleural rub or bronchial breathing.   Heart:  Normal S1 and S2, no murmur, no gallop, no rub. No JVD   Abdomen:   Normal bowel sounds, no masses, no organomegaly. Soft       non-tender, non-distended, no guarding, no rebound tenderness.   Extremities: Moves all extremities well, no edema, no cyanosis, no            clubbing.   Skin: No bleeding, bruising or rash.   Neurologic: Awake, alert and oriented times 3. Moves all 4 extremities equally.   Laboratory results:      Results from last 7 days  Lab Units 02/19/17  0431 02/18/17  0508   SODIUM mmol/L 147* 145   POTASSIUM mmol/L 3.5 3.7   CHLORIDE mmol/L 114* 112*   TOTAL CO2 mmol/L 27.0 27.0   BUN mg/dL 22* 25*   CREATININE mg/dL 0.50* 0.60   CALCIUM mg/dL 8.7 8.9   BILIRUBIN mg/dL  --  0.3   ALK PHOS U/L  --  62   ALT (SGPT) U/L  --  113*   AST (SGOT) U/L  --  71*   GLUCOSE mg/dL 144* 118*       Results from last 7 days  Lab Units 02/19/17  0430 02/18/17  0508   WBC 10*3/mm3 7.04 8.20   HEMOGLOBIN g/dL 9.3* 9.2*   HEMATOCRIT % 30.0* 28.8*   PLATELETS 10*3/mm3 260 267           Results from last 7 days  Lab Units 02/18/17  0508 02/17/17  1312   CK TOTAL U/L  --  32   TROPONIN I ng/mL 0.018 0.046*       Results from last 7 days  Lab Units 02/17/17  1312 02/17/17  1311 02/17/17  1116   BLOODCX  No growth at 2 days No growth at 2 days  --    MRSA SCREEN CX   --   --  No Methicillin Resistant Staphylococcus aureus  isolated       I have reviewed the patient's laboratory results.    Radiology results:    Imaging Results (last 24 hours)     Procedure Component Value Units Date/Time    XR Chest 1 View [[id number]] Collected:  02/18/17 0830     Updated:  02/18/17 0834    Narrative:       PROCEDURE: XR CHEST 1 VW-     HISTORY: Pneumonia.     COMPARISON: None.      FINDINGS:    . The heart is normal in size. The mediastinum is  unremarkable. The lungs are hyperinflated consistent with COPD. There  are mild right lung base opacities which may represent pneumonia or  atelectasis.. There is no pneumothorax. The bony thorax in intact.           Impression:       COPD.     Right lung base pneumonia or atelectasis..        This report was finalized on 2/18/2017 8:32 AM by Nestor Cardenas MD.          I have reviewed the patient's radiology reports.    Medication Review:     I have reviewed the patients active and prn medications.     Active Problems:    Acute exacerbation of chronic obstructive pulmonary disease (COPD)    Acute hypercapnic respiratory failure    Acute on chronic respiratory failure with hypoxia    Pneumonia of right lower lobe due to infectious organism    Cigarette nicotine dependence with nicotine-induced disorder      Assessment:    1. Acute hypercapnic respiratory failure, present on admission.  2. Acute on chronic hypoxic respiratory failure, present on admission.  3. Acute COPD exacerbation, present on admission.  4. Right lower lobe bacterial pneumonia, community-acquired, present on admission.  5. Acute atrial fibrillation with rapid ventricular rate.  6. Essential hypertension.  7. Coronary artery disease.  8. Nicotine dependence.  9. Hepatitis, uncertain etiology, possibly related to statin use.  10. Moderate malnutrition with BMI of 17.  11.  Low TSH levels (suspected hyperthyroidism).    Plan:    Patient has been on Cardizem drip in the last 48 hours and is currently being discontinued by Dr. Goetz.  She will  be continued on BiPAP.  She is on therapeutic Lovenox as well.  She will be continued on Rocephin and azithromycin for her right lower lobe pneumonia.  Sputum culture is currently pending.  She will be continued on oxygen, bronchodilator nebulization, budesonide, IV Solu-Medrol as well as mucolytic agents.  We will also continue the BiPAP through the night and as needed during the day. Her liver functions have improved.  I have discussed the patient's condition with Dr. Goetz.  We will continue to monitor her in the ICU today.     Patient does have low TSH levels which was conformed by a repeat test this morning.  She is not on any thyroid medications at home.  Hopefully, beta blocker therapy will help with her cardiac symptoms.  She may benefit from initiation of methimazole and outpatient endocrinology follow-up.    Shakeel Machado MD  02/19/17  2:40 PM

## 2017-02-19 NOTE — PLAN OF CARE
Problem: Patient Care Overview (Adult)  Goal: Plan of Care Review  Outcome: Ongoing (interventions implemented as appropriate)    02/19/17 1639   Coping/Psychosocial Response Interventions   Plan Of Care Reviewed With patient   Patient Care Overview   Progress improving         Problem: NPPV/CPAP (Adult)  Intervention: Assess/Manage Patient Tolerance of Noninvasive Ventilation    02/19/17 1639   Respiratory Interventions   Airway/Ventilation Management calming measures promoted         Goal: Signs and Symptoms of Listed Potential Problems Will be Absent or Manageable (NPPV/CPAP)  Outcome: Ongoing (interventions implemented as appropriate)    02/19/17 1639   NPPV/CPAP   Problems Assessed (NPPV/CPAP) hypoxia/hypoxemia   Problems Present (NPPV/CPAP) none

## 2017-02-20 LAB
BH CV ECHO MEAS - % IVS THICK: 28.6 %
BH CV ECHO MEAS - % LVPW THICK: 34.5 %
BH CV ECHO MEAS - AO ACC SLOPE: 2112 CM/SEC^2
BH CV ECHO MEAS - AO ACC TIME: 0.06 SEC
BH CV ECHO MEAS - AO MAX PG (FULL): 14 MMHG
BH CV ECHO MEAS - AO MAX PG: 21.6 MMHG
BH CV ECHO MEAS - AO MEAN PG (FULL): 6.8 MMHG
BH CV ECHO MEAS - AO MEAN PG: 9.8 MMHG
BH CV ECHO MEAS - AO ROOT AREA (BSA CORRECTED): 1.9
BH CV ECHO MEAS - AO ROOT AREA: 6.1 CM^2
BH CV ECHO MEAS - AO ROOT DIAM: 2.8 CM
BH CV ECHO MEAS - AO V2 MAX: 232.1 CM/SEC
BH CV ECHO MEAS - AO V2 MEAN: 142.7 CM/SEC
BH CV ECHO MEAS - AO V2 VTI: 43.2 CM
BH CV ECHO MEAS - AVA(I,A): 1.2 CM^2
BH CV ECHO MEAS - AVA(I,D): 1.2 CM^2
BH CV ECHO MEAS - AVA(V,A): 1.3 CM^2
BH CV ECHO MEAS - AVA(V,D): 1.3 CM^2
BH CV ECHO MEAS - BSA(HAYCOCK): 1.5 M^2
BH CV ECHO MEAS - BSA: 1.5 M^2
BH CV ECHO MEAS - BZI_BMI: 19.1 KILOGRAMS/M^2
BH CV ECHO MEAS - BZI_METRIC_HEIGHT: 160 CM
BH CV ECHO MEAS - BZI_METRIC_WEIGHT: 49 KG
BH CV ECHO MEAS - EDV(CUBED): 135.3 ML
BH CV ECHO MEAS - EDV(TEICH): 125.7 ML
BH CV ECHO MEAS - EF(CUBED): 84.6 %
BH CV ECHO MEAS - EF(TEICH): 77.5 %
BH CV ECHO MEAS - ESV(CUBED): 20.8 ML
BH CV ECHO MEAS - ESV(TEICH): 28.3 ML
BH CV ECHO MEAS - FS: 46.4 %
BH CV ECHO MEAS - IVS/LVPW: 0.72
BH CV ECHO MEAS - IVSD: 0.77 CM
BH CV ECHO MEAS - IVSS: 0.99 CM
BH CV ECHO MEAS - LA DIMENSION: 3.9 CM
BH CV ECHO MEAS - LA/AO: 1.4
BH CV ECHO MEAS - LV MASS(C)D: 169.4 GRAMS
BH CV ECHO MEAS - LV MASS(C)DI: 113.8 GRAMS/M^2
BH CV ECHO MEAS - LV MASS(C)S: 98.3 GRAMS
BH CV ECHO MEAS - LV MASS(C)SI: 66 GRAMS/M^2
BH CV ECHO MEAS - LV MAX PG: 7.5 MMHG
BH CV ECHO MEAS - LV MEAN PG: 3 MMHG
BH CV ECHO MEAS - LV V1 MAX: 137 CM/SEC
BH CV ECHO MEAS - LV V1 MEAN: 76.5 CM/SEC
BH CV ECHO MEAS - LV V1 VTI: 24.5 CM
BH CV ECHO MEAS - LVIDD: 5.1 CM
BH CV ECHO MEAS - LVIDS: 2.7 CM
BH CV ECHO MEAS - LVOT AREA (M): 2.3 CM^2
BH CV ECHO MEAS - LVOT AREA: 2.2 CM^2
BH CV ECHO MEAS - LVOT DIAM: 1.7 CM
BH CV ECHO MEAS - LVPWD: 1.1 CM
BH CV ECHO MEAS - LVPWS: 1.4 CM
BH CV ECHO MEAS - MV A MAX VEL: 142 CM/SEC
BH CV ECHO MEAS - MV DEC SLOPE: 720.8 CM/SEC^2
BH CV ECHO MEAS - MV E MAX VEL: 122.8 CM/SEC
BH CV ECHO MEAS - MV E/A: 0.86
BH CV ECHO MEAS - MV MAX PG: 7.9 MMHG
BH CV ECHO MEAS - MV MEAN PG: 4.3 MMHG
BH CV ECHO MEAS - MV P1/2T MAX VEL: 111.3 CM/SEC
BH CV ECHO MEAS - MV P1/2T: 45.2 MSEC
BH CV ECHO MEAS - MV V2 MAX: 140.9 CM/SEC
BH CV ECHO MEAS - MV V2 MEAN: 99.4 CM/SEC
BH CV ECHO MEAS - MV V2 VTI: 25.6 CM
BH CV ECHO MEAS - MVA P1/2T LCG: 2 CM^2
BH CV ECHO MEAS - MVA(P1/2T): 4.9 CM^2
BH CV ECHO MEAS - MVA(VTI): 2.1 CM^2
BH CV ECHO MEAS - PA ACC SLOPE: 1008 CM/SEC^2
BH CV ECHO MEAS - PA ACC TIME: 0.09 SEC
BH CV ECHO MEAS - PA MAX PG: 3 MMHG
BH CV ECHO MEAS - PA MEAN PG: 1.6 MMHG
BH CV ECHO MEAS - PA PR(ACCEL): 37.8 MMHG
BH CV ECHO MEAS - PA V2 MAX: 86.4 CM/SEC
BH CV ECHO MEAS - PA V2 MEAN: 60.3 CM/SEC
BH CV ECHO MEAS - PA V2 VTI: 21.7 CM
BH CV ECHO MEAS - SI(AO): 177.1 ML/M^2
BH CV ECHO MEAS - SI(CUBED): 76.9 ML/M^2
BH CV ECHO MEAS - SI(LVOT): 35.5 ML/M^2
BH CV ECHO MEAS - SI(TEICH): 65.5 ML/M^2
BH CV ECHO MEAS - SV(AO): 263.5 ML
BH CV ECHO MEAS - SV(CUBED): 114.5 ML
BH CV ECHO MEAS - SV(LVOT): 52.9 ML
BH CV ECHO MEAS - SV(TEICH): 97.4 ML
BH CV ECHO MEAS - TR MAX VEL: 310.4 CM/SEC

## 2017-02-20 PROCEDURE — 99232 SBSQ HOSP IP/OBS MODERATE 35: CPT | Performed by: HOSPITALIST

## 2017-02-20 PROCEDURE — 25010000002 HYDRALAZINE PER 20 MG: Performed by: FAMILY MEDICINE

## 2017-02-20 PROCEDURE — 25010000002 VANCOMYCIN PER 500 MG: Performed by: INTERNAL MEDICINE

## 2017-02-20 PROCEDURE — 94799 UNLISTED PULMONARY SVC/PX: CPT

## 2017-02-20 PROCEDURE — 99233 SBSQ HOSP IP/OBS HIGH 50: CPT | Performed by: INTERNAL MEDICINE

## 2017-02-20 PROCEDURE — 94660 CPAP INITIATION&MGMT: CPT

## 2017-02-20 PROCEDURE — 94640 AIRWAY INHALATION TREATMENT: CPT

## 2017-02-20 PROCEDURE — 25010000002 METHYLPREDNISOLONE PER 40 MG: Performed by: INTERNAL MEDICINE

## 2017-02-20 PROCEDURE — 25010000002 CEFTRIAXONE: Performed by: INTERNAL MEDICINE

## 2017-02-20 PROCEDURE — 25010000002 AZITHROMYCIN: Performed by: INTERNAL MEDICINE

## 2017-02-20 PROCEDURE — 25010000002 ENOXAPARIN PER 10 MG: Performed by: INTERNAL MEDICINE

## 2017-02-20 RX ORDER — BISACODYL 10 MG
10 SUPPOSITORY, RECTAL RECTAL DAILY
Status: DISCONTINUED | OUTPATIENT
Start: 2017-02-20 | End: 2017-02-21

## 2017-02-20 RX ADMIN — DOCUSATE SODIUM 100 MG: 100 CAPSULE, LIQUID FILLED ORAL at 08:12

## 2017-02-20 RX ADMIN — LISINOPRIL 10 MG: 10 TABLET ORAL at 11:45

## 2017-02-20 RX ADMIN — ATENOLOL 100 MG: 50 TABLET ORAL at 08:11

## 2017-02-20 RX ADMIN — IPRATROPIUM BROMIDE AND ALBUTEROL SULFATE 3 ML: .5; 3 SOLUTION RESPIRATORY (INHALATION) at 11:36

## 2017-02-20 RX ADMIN — IPRATROPIUM BROMIDE AND ALBUTEROL SULFATE 3 ML: .5; 3 SOLUTION RESPIRATORY (INHALATION) at 07:18

## 2017-02-20 RX ADMIN — IPRATROPIUM BROMIDE AND ALBUTEROL SULFATE 3 ML: .5; 3 SOLUTION RESPIRATORY (INHALATION) at 01:11

## 2017-02-20 RX ADMIN — BUDESONIDE 0.5 MG: 0.5 INHALANT RESPIRATORY (INHALATION) at 07:18

## 2017-02-20 RX ADMIN — LORAZEPAM 1 MG: 0.5 TABLET ORAL at 17:13

## 2017-02-20 RX ADMIN — IPRATROPIUM BROMIDE AND ALBUTEROL SULFATE 3 ML: .5; 3 SOLUTION RESPIRATORY (INHALATION) at 16:43

## 2017-02-20 RX ADMIN — CEFTRIAXONE 1 G: 1 INJECTION, POWDER, FOR SOLUTION INTRAMUSCULAR; INTRAVENOUS at 11:44

## 2017-02-20 RX ADMIN — ASPIRIN 81 MG 81 MG: 81 TABLET ORAL at 08:11

## 2017-02-20 RX ADMIN — ENOXAPARIN SODIUM 40 MG: 40 INJECTION SUBCUTANEOUS at 11:45

## 2017-02-20 RX ADMIN — Medication 1 EACH: at 04:21

## 2017-02-20 RX ADMIN — GUAIFENESIN 600 MG: 600 TABLET, EXTENDED RELEASE ORAL at 08:12

## 2017-02-20 RX ADMIN — BUDESONIDE 0.5 MG: 0.5 INHALANT RESPIRATORY (INHALATION) at 19:26

## 2017-02-20 RX ADMIN — MELATONIN TAB 3 MG 4.5 MG: 3 TAB at 22:13

## 2017-02-20 RX ADMIN — GUAIFENESIN 600 MG: 600 TABLET, EXTENDED RELEASE ORAL at 17:14

## 2017-02-20 RX ADMIN — NICOTINE 1 PATCH: 14 PATCH TRANSDERMAL at 11:46

## 2017-02-20 RX ADMIN — METHYLPREDNISOLONE SODIUM SUCCINATE 40 MG: 40 INJECTION, POWDER, FOR SOLUTION INTRAMUSCULAR; INTRAVENOUS at 11:44

## 2017-02-20 RX ADMIN — Medication 10 ML: at 04:17

## 2017-02-20 RX ADMIN — BISACODYL 10 MG: 10 SUPPOSITORY RECTAL at 17:13

## 2017-02-20 RX ADMIN — DILTIAZEM HYDROCHLORIDE 90 MG: 30 TABLET, FILM COATED ORAL at 11:44

## 2017-02-20 RX ADMIN — ESCITALOPRAM OXALATE 20 MG: 20 TABLET ORAL at 08:11

## 2017-02-20 RX ADMIN — Medication 10 ML: at 22:20

## 2017-02-20 RX ADMIN — Medication 1 EACH: at 20:32

## 2017-02-20 RX ADMIN — PRAVASTATIN SODIUM 40 MG: 20 TABLET ORAL at 20:26

## 2017-02-20 RX ADMIN — DILTIAZEM HYDROCHLORIDE 90 MG: 30 TABLET, FILM COATED ORAL at 17:16

## 2017-02-20 RX ADMIN — PANTOPRAZOLE SODIUM 40 MG: 40 TABLET, DELAYED RELEASE ORAL at 05:12

## 2017-02-20 RX ADMIN — HYDRALAZINE HYDROCHLORIDE 10 MG: 20 INJECTION INTRAMUSCULAR; INTRAVENOUS at 08:46

## 2017-02-20 RX ADMIN — LORAZEPAM 1 MG: 0.5 TABLET ORAL at 22:13

## 2017-02-20 RX ADMIN — HYDRALAZINE HYDROCHLORIDE 10 MG: 20 INJECTION INTRAMUSCULAR; INTRAVENOUS at 04:17

## 2017-02-20 RX ADMIN — DOCUSATE SODIUM 100 MG: 100 CAPSULE, LIQUID FILLED ORAL at 17:13

## 2017-02-20 RX ADMIN — LORAZEPAM 1 MG: 0.5 TABLET ORAL at 08:11

## 2017-02-20 RX ADMIN — ATENOLOL 100 MG: 50 TABLET ORAL at 17:14

## 2017-02-20 RX ADMIN — IPRATROPIUM BROMIDE AND ALBUTEROL SULFATE 3 ML: .5; 3 SOLUTION RESPIRATORY (INHALATION) at 19:26

## 2017-02-20 RX ADMIN — VANCOMYCIN HYDROCHLORIDE 750 MG: 750 INJECTION, SOLUTION INTRAVENOUS at 20:26

## 2017-02-20 RX ADMIN — AZITHROMYCIN 500 MG: 500 INJECTION, POWDER, LYOPHILIZED, FOR SOLUTION INTRAVENOUS at 13:09

## 2017-02-20 RX ADMIN — FLUTICASONE PROPIONATE 2 SPRAY: 50 SPRAY, METERED NASAL at 08:10

## 2017-02-20 NOTE — CONSULTS
Joel Goetz MD  CARDIOLOGY FOLLOW UP NOTE    PATIENT: Elizabeth Fulton                                                   DATE: 17   I06/  : 1950     PRIMARY PHYSICIAN: Young Nugent MD    Reason for consult: History of atrial fibrillation with rapid ventricular response, acute and chronic hypoxemic respiratory failure, borderline cardiac markers and accelerated hypertension    Subjective: Patient developed rather acute shortness of air despite using positive pressure breathing last night.  Patient did not have any associated cough or sputum expectoration.  Patient has maintained normal sinus rhythm without recurrence of breakthrough atrial fibrillation.  Patient is currently on high-dose beta blocker therapy.  Patient has not experienced any chest pain and she believes her shortness of air has stabilized as well.  Patient has history of cough with minimal if any sputum expectoration.  Her oral intake has improved to some degree with no associated nausea or vomiting.  Patient was noticed to have markedly elevated blood pressure prompting additional antihypertensive regimen.    PAST MEDICAL HISTORY:     1. Atherosclerotic cardiovascular disease.  a. Peripheral arterial disease. Bilateral femoral bruits, especially right, with no definitive intermittent claudication.  b. Moderately high likelihood of underlying coronary artery disease based on risk profile and evidence of peripheral arterial disease with no prior scintigraphic or angiographic studies. On echocardiographic studies performed today, patient did not have any wall motion abnormalities.  c. Potential renovascular disease.  d. Potential mesenteric disease.  2. History of longstanding hypertension with hypertensive heart disease. Patient apparently has been hypertensive for several decades, but is not known to have definitive underlying renal parenchymal and renovascular disease. She has been on ACE inhibitor therapy and diuretic  "therapy.  3. Longstanding dyslipidemia.  4. History of advanced COPD with known respiratory failure, with suggestion of borderline RV function. Her RV function however was normal and right-sided filling pressures were normal as well.  Patient has previously undergone CT scan of the P protocol which was negative.  5. History of mild aortic sclerosis and mild mitral insufficiency.  6. Paroxysmal atrial dysrhythmia with no definitive known sustained dysrhythmias. Patient with anemia, has not been on antithrombotic therapy in the past.  7. Potential GI hemorrhage.  8. History of anxiety state and depression and history of prior colon malignancies status post chemotherapy with radiation.    PAST SURGICAL HISTORY:   1. Appendectomy.  2. Cholecystectomy.    SOCIAL HISTORY:  Patient had worked as a . She is retired and she is a . She has moved from [place] to live with her daughter in [place]. Patient continues to smoke possibly half pack to a pack per day.    FAMILY HISTORY: Not contributory    PHYSICAL EXAMINATION:  GENERAL: Pale-looking elderly female  Visit Vitals   • BP (!) 182/92   • Pulse 80   • Temp 97.6 °F (36.4 °C) (Oral)   • Resp 18   • Ht 63\" (160 cm)   • Wt 107 lb 9.6 oz (48.8 kg)   • SpO2 100%   • BMI 19.06 kg/m2    Body mass index is 19.06 kg/(m^2). HEENT:  Head: Atraumatic, normocephalic, No tenderness over paranasal sinuses, external nares normal. No oral or nasal mucosal lesion. Sclera non-icteric ocular movements are normal with pupils reacting both to light and accommodations. Ocular fundi not seen. NECK: Carotid upstroke is normal, there are no carotid bruits, no JVD, no thyromegaly, no cervical or axillary lymphadenopathy. HEART: [place] beat is not displaced, no thrill is appreciated and both heart sounds are normal.  There is no murmur or rub audible. BRONCHOPULMONARY: Patient has markedly diminished air entry with bronchovesicular sounds. No adventious sounds audible. GI & : Soft, " no tenderness elicited and no viscera are palpable. Bowel sounds are positive and there is no renal bruits audible. EXTREMITIES:  Distal vessels could not be palpated there are no femoral bruits audible. Patient does not have dependent edema. DERM: No skin rash. CNS: No gross motor neurological deficit. MUSCULOSKELETAL: No joint swelling notice and patient has normal range of motion in lower extremity.       Intake/Output Summary (Last 24 hours) at 02/20/17 0856  Last data filed at 02/20/17 0700   Gross per 24 hour   Intake   2611 ml   Output    925 ml   Net   1686 ml     Wt Readings from Last 7 Encounters:   02/20/17 107 lb 9.6 oz (48.8 kg)     LABS:     Results from last 7 days  Lab Units 02/19/17  0430 02/18/17  0508   WBC 10*3/mm3 7.04 8.20   HEMOGLOBIN g/dL 9.3* 9.2*   HEMATOCRIT % 30.0* 28.8*   PLATELETS 10*3/mm3 260 267                     Results from last 7 days  Lab Units 02/18/17  0508 02/17/17  1312   CK TOTAL U/L  --  32   TROPONIN I ng/mL 0.018 0.046*     No results found for: HGBA1C    Results from last 7 days  Lab Units 02/19/17  0431   TSH mIU/mL <0.015*   FREE T4 ng/dL 1.61           RADIOLOGY:   Imaging Results (last 24 hours)     ** No results found for the last 24 hours. **          No Known Allergies    aspirin 81 mg Oral Daily   atenolol 100 mg Oral BID   azithromycin 500 mg Intravenous Q24H   budesonide 0.5 mg Nebulization BID - RT   ceftriaxone 1 g Intravenous Q24H   diltiaZEM 90 mg Oral Q6H   docusate sodium 100 mg Oral BID   enoxaparin 40 mg Subcutaneous Q12H   escitalopram 20 mg Oral Daily   fluticasone 2 spray Nasal Daily   guaiFENesin 600 mg Oral BID   ipratropium-albuterol 3 mL Nebulization Q6H - RT   lisinopril 10 mg Oral Daily   LORazepam 1 mg Oral BID   methylPREDNISolone sodium succinate 40 mg Intravenous Q12H   nicotine 1 patch Transdermal Q24H   pantoprazole 40 mg Oral Q AM   pravastatin 40 mg Oral Nightly   sodium chloride 1,000 mL Intravenous Once   vancomycin 750 mg  Intravenous Q24H        ASSESSMENT /PLAN:    1. Patient is 66 years old-year-old  female with significant risk profile for atherosclerotic cardiovascular disease outlined earlier, who appears to have rather advanced end-stage chronic obstructive pulmonary disease with frequent exacerbation and history of acute on chronic hypoxemic respiratory failure and early cor pulmonale, who was hospitalized on account of apparent chronic obstructive pulmonary disease exacerbation and was noticed to have atrial fibrillation with rapid ventricular response prompting multiple AV blockers.  Patient has reverted to normal sinus rhythm and has not required antiarrhythmic therapy.  Patient has not been started on antithrombotic therapy given prior history of melena.  Patient does have have several substrates for atrial fibrillation with history of occasional palpitation in the past, but there is no definitive historic data suggesting sustained dysrhythmias prior to current hospitalization and she would not necessarily merit antiarrhythmic therapy.  COPD exacerbation with acute and chronic hypoxemic and hypercapnic respiratory failure appears to be major underlying mechanism of her recurrent dysrhythmia.  As noted earlier, patient appears to have maintained sinus rhythm without any breakthrough dysrhythmia.  It would be desirable to continue the patient on AV blocker therapy preferably current dosages of up titrated beta blocker therapy given potential underlying CAD.  As her blood pressure has been markedly elevated a case can be made for substitution of her antihypertensive regimen comprising of amlodipine with Cardizem therapy as well.    2. Patient, who has high likelihood of CAD, has had history of  borderline cardiac markers. In all likelihood, given suggestion of mild sepsis syndrome, it may reflect acute systemic inflammatory response versus RV strain as she does have prior history of borderline RV function.  Understandably, on account of risk profile, especially given evidence of peripheral arterial disease, patient has moderate if not higher potential for underlying coronary artery disease.  However it was felt that in the absence of a new symptomatology, she would not necessarily merit invasive workup at this stage but as an outpatient, perfusion study would still be relevant.  Patient has substantial potential for cardiovascular morbidity and mortality.  Patient would not necessarily merit long acting nitrates which has been discontinued.  All these issues have been discussed with the patient in depth.  3. Patient appears to have diffuse atherosclerotic process.  Patient was noticed to have significant elevation of blood pressure, which may be driven by steroid therapy. She does appear to have low to moderate potential for renovascular disease for which outpatient workup would be arranged.  Patient is currently on hydralazine that given very hyperdynamic LV function it was felt that calcium tenderness therapy may be preferably with diltiazem.  4. Risk reduction. I had extensive discussion with the patient as well as with her extended family, which all appear to be smokers. Patient does not appear to be keen on smoking cessation .  Nonetheless patient would merit nicotine replacement therapy as an outpatient.  Apparently patient has been on Chantix in the past.  5. Other issues noted include history of rather advanced chronic obstructive pulmonary disease with frequent exacerbation with acute on chronic hypoxemic respiratory failure with altered mental status secondary to hypercapnia for which patient under care of pulmonary services.  Patient was documented to have very hyperdynamic LV function with pulmonary hypertension though filling pressures appeared appropriate.  6.  Additional diagnoses include chronic anxiety state which she cites as major underlying reason that she has not been able to quit smoking.                      In closing, I sincerely appreciate opportunity to participate in care of this patient. If I can be of any further assistance with the management of this patient, please don’t hesitate to contact me.    DARRELL OROZCO M.D. [facility]

## 2017-02-20 NOTE — PLAN OF CARE
Problem: Patient Care Overview (Adult)  Goal: Plan of Care Review  Outcome: Ongoing (interventions implemented as appropriate)    02/20/17 0522   Coping/Psychosocial Response Interventions   Plan Of Care Reviewed With patient   Outcome Evaluation   Outcome Summary/Follow up Plan pt currently tolerating pap device with .50 O2flow. Gets anxious easily.Exhibits SWANSON.   Patient Care Overview   Progress progress towards functional goals is fair         Problem: Pneumonia (Adult)  Goal: Signs and Symptoms of Listed Potential Problems Will be Absent or Manageable (Pneumonia)  Outcome: Ongoing (interventions implemented as appropriate)    Problem: NPPV/CPAP (Adult)  Goal: Signs and Symptoms of Listed Potential Problems Will be Absent or Manageable (NPPV/CPAP)  Outcome: Ongoing (interventions implemented as appropriate)

## 2017-02-20 NOTE — PROGRESS NOTES
S: Is currently on BiPAP.    O:Vital signs reviewed. I & Os reviewed. O2Sat: 98 % on BiPAP.    General: Mild  acute distress noted.  Eyes: PERRL. EOM Sluggish   Neck: Supple  Cardiovascular: S1 + S2. Regular.   Respiratory: Mild - Moderate respiratory distress noted. Mild wheezing heard. No crackles noted  Abdomen: Soft. Bowel sounds positive.   Extremities: No edema noted.  Neurologic: AAOx3. Was able to follow commands.   Skin: Appeared somewhat dry     Assessment & Recommendations/Plan:   1. Acute hypercarbic respiratory failure  2. Unlikely Pneumonia  3. Severe COPD.  4. Continued smoking.  5. Atrial fibrillation with RVR.  6. Accelerated hypertension.    D/W Dr. Goetz at the bedside. Will repeat ABG in the morning. He is adjusting Cardizem due to BP and HR.      Yvon Fam MD  02/20/17  8:40 AM

## 2017-02-20 NOTE — PROGRESS NOTES
INPATIENT PROGRESS NOTE    Date of Admission: 2/17/2017  Length of Stay: 3  Primary Care Physician: Young Nugent DO    Subjective   Chief Complaint:  Dyspnea  HPI: This is a 66-year-old female with history of COPD on home oxygen at 3 L, coronary artery disease, hypertension was transferred from [facility] emergency room with acute hypoxic and hypercapnic respiratory failure. The history is obtained from the patient, her daughter as well as the medical chart.     The patient states that she was in her usual state of health until a few days ago when she started having increasing shortness of breath and cough. She continued to use nebulizers and oxygen at home without any improvement. Early this morning around 3 AM she got significantly worse and EMS was called. Patient was taken to the emergency room after she received albuterol nebulization and IV Solu-Medrol by the EMS. She was evaluated in the emergency room and her initial temperature was 99.6. Blood pressure was 221/108 and pulse was 103. She was saturating at 84% with a respiratory rate of 28. Patient was given duo neb and IV Levaquin. Her white count was noted to be 29,000. Her troponin was negative. Chest x-ray showed possible right lower lobe pneumonia. ABG done in the emergency room showed hypercapnia with a PCO2 of 74. She was placed on BiPAP with improvement in her CO2 levels. She was given 1 L of normal saline. Due to unavailability of ICU beds, she was transferred to [facility] intensive care unit.     Patient has history of COPD and has been on home oxygen for the last 2 years. She has duoneb at home and also uses Symbicort and Spiriva. She does not follow-up with pulmonology. She lives with her daughter and is independent in daily activities. She does not use CPAP at home.     Patient is currently comfortable on BiPAP. She does have history of paroxysmal atrial fibrillation but is not on any anticoagulation. She  was admitted to [facility] in December 2016 for COPD exacerbation and at that time she was told to have had a mild heart attack. She was supposed to follow-up with Dr. Goetz which she has not been able to.     After about an hour being in the ICU, she developed atrial fibrillation with rapid ventricular rate with her heart rate in the 170s. She was given IV Cardizem and has been started on oral Cardizem.      Interval History:  02/20/17 - Ms. Fulton was seen and examined x 2 in the ICU today. Patient remains essentially biPAP dependent at this point.  She is cachetic.  She denies needs.  No f/c/ns/chest pain.    Review Of Systems:   Review of Systems    Objective      Temp:  [97.6 °F (36.4 °C)-98.8 °F (37.1 °C)] 98.3 °F (36.8 °C)  Heart Rate:  [] 86  Resp:  [16-25] 22  BP: (117-203)/() 131/72  Gen: Alert, appropriate, pleasant and interactive  HEENT: EOMI, ATNC, MMM  Neck: Supple  Heart: S1S2, IRRR  Lungs: decreased with occ exp wheeze  Abdomen: Soft, NTND, BS+  Extremities: Warm, well-perfused, + pulses  Skin: P/W/D  Neuro: A/O x3, speech clear    Results Review:    I have reviewed the labs, radiology results and diagnostic studies.    Lab Results (last 24 hours)     Procedure Component Value Units Date/Time    Blood Culture [[id number]]  (Normal) Collected:  02/17/17 1311    Specimen:  Blood from Hand, Left Updated:  02/20/17 1401     Blood Culture No growth at 3 days     Blood Culture [[id number]]  (Normal) Collected:  02/17/17 1312    Specimen:  Blood from Hand, Left Updated:  02/20/17 1401     Blood Culture No growth at 3 days           Culture Data:   BLOOD CULTURE   Date Value Ref Range Status   02/17/2017 No growth at 3 days  Preliminary              MRSA SCREEN CX   Date Value Ref Range Status   02/17/2017   Final    No Methicillin Resistant Staphylococcus aureus isolated                Imaging Results (most recent)     Procedure Component Value Units Date/Time    XR Chest 1 View  [[id number]] Collected:  02/18/17 0830     Updated:  02/18/17 0834    Narrative:       PROCEDURE: XR CHEST 1 VW-     HISTORY: Pneumonia.     COMPARISON: None.      FINDINGS:    . The heart is normal in size. The mediastinum is  unremarkable. The lungs are hyperinflated consistent with COPD. There  are mild right lung base opacities which may represent pneumonia or  atelectasis.. There is no pneumothorax. The bony thorax in intact.           Impression:       COPD.     Right lung base pneumonia or atelectasis..        This report was finalized on 2/18/2017 8:32 AM by Nestor Cardenas MD.          I have reviewed the medications.      Current Facility-Administered Medications:   •  acetaminophen (TYLENOL) tablet 650 mg, 650 mg, Oral, Q4H PRN, Shakeel Machado MD  •  ammonium lactate (LAC-HYDRIN) 12 % lotion, , Topical, BID PRN, Shakeel Machado MD  •  aspirin chewable tablet 81 mg, 81 mg, Oral, Daily, Shakeel Machado MD, 81 mg at 02/20/17 0811  •  atenolol (TENORMIN) tablet 100 mg, 100 mg, Oral, BID, Joel Goetz MD, 100 mg at 02/20/17 1714  •  AZITHROMYCIN 500 MG/250 ML 0.9% NS IVPB (vial-mate), 500 mg, Intravenous, Q24H, Shakeel Machado MD, 500 mg at 02/20/17 1309  •  benzonatate (TESSALON) capsule 100 mg, 100 mg, Oral, TID PRN, Shakeel Machado MD  •  bisacodyl (DULCOLAX) suppository 10 mg, 10 mg, Rectal, Daily, Sal Spann MD, 10 mg at 02/20/17 1713  •  budesonide (PULMICORT) nebulizer solution 0.5 mg, 0.5 mg, Nebulization, BID - RT, Shakeel Machado MD, 0.5 mg at 02/20/17 0718  •  cefTRIAXone (ROCEPHIN) 1 g/50 mL 0.9% NS VTB (mbp), 1 g, Intravenous, Q24H, Shakeel Machado MD, 1 g at 02/20/17 1144  •  CHAPSTICK 1 each, 1 each, Apply externally, PRN, Shakeel Machado MD, 1 each at 02/20/17 0421  •  diltiaZEM (CARDIZEM) tablet 90 mg, 90 mg, Oral, Q6H, Joel Goetz MD, 90 mg at 02/20/17 1716  •  docusate sodium (COLACE) capsule 100 mg, 100 mg, Oral, BID, Shakeel Machado MD, 100 mg at 02/20/17 1713  •  enoxaparin (LOVENOX) syringe 40 mg,  40 mg, Subcutaneous, Q12H, Shakeel Machado MD, 40 mg at 02/20/17 1145  •  escitalopram (LEXAPRO) tablet 20 mg, 20 mg, Oral, Daily, Shakeel Machado MD, 20 mg at 02/20/17 0811  •  fluticasone (FLONASE) 50 MCG/ACT nasal spray 2 spray, 2 spray, Nasal, Daily, Shakeel Machado MD, 2 spray at 02/20/17 0810  •  guaiFENesin (MUCINEX) 12 hr tablet 600 mg, 600 mg, Oral, BID, Shakeel Machado MD, 600 mg at 02/20/17 1714  •  hydrALAZINE (APRESOLINE) injection 10 mg, 10 mg, Intravenous, Q6H PRN, Nadine Kapoor DO, 10 mg at 02/20/17 0846  •  ipratropium-albuterol (DUO-NEB) nebulizer solution 3 mL, 3 mL, Nebulization, Q4H PRN, Shakeel Machado MD, 3 mL at 02/20/17 1643  •  ipratropium-albuterol (DUO-NEB) nebulizer solution 3 mL, 3 mL, Nebulization, Q6H - RT, Shakeel Machado MD, 3 mL at 02/20/17 0718  •  lisinopril (PRINIVIL,ZESTRIL) tablet 10 mg, 10 mg, Oral, Daily, Yvon Fam MD, 10 mg at 02/20/17 1145  •  LORazepam (ATIVAN) tablet 1 mg, 1 mg, Oral, BID, Shakeel Machado MD, 1 mg at 02/20/17 1713  •  LORazepam (ATIVAN) tablet 1 mg, 1 mg, Oral, Nightly PRN, Nadine Kapoor DO, 1 mg at 02/19/17 2248  •  melatonin tablet 4.5 mg, 4.5 mg, Oral, Nightly PRN, Nadine Kapoor DO, 4.5 mg at 02/19/17 2247  •  methylPREDNISolone sodium succinate (SOLU-Medrol) injection 40 mg, 40 mg, Intravenous, Q12H, Shakeel Machado MD, 40 mg at 02/20/17 1144  •  nicotine (NICODERM CQ) 14 MG/24HR patch 1 patch, 1 patch, Transdermal, Q24H, Shakeel Machado MD, 1 patch at 02/20/17 1146  •  ondansetron (ZOFRAN) tablet 4 mg, 4 mg, Oral, Q6H PRN **OR** ondansetron ODT (ZOFRAN-ODT) disintegrating tablet 4 mg, 4 mg, Oral, Q6H PRN **OR** ondansetron (ZOFRAN) injection 4 mg, 4 mg, Intravenous, Q6H PRN, Shakeel Machado MD  •  pantoprazole (PROTONIX) EC tablet 40 mg, 40 mg, Oral, Q AM, Shakeel Machado MD, 40 mg at 02/20/17 0512  •  Pharmacy to dose vancomycin, , Does not apply, Continuous PRN, Nadine Kapoor,   •  pravastatin (PRAVACHOL) tablet 40 mg, 40 mg, Oral, Nightly, Shakeel Machado MD, 40 mg  at 02/19/17 2044  •  sodium chloride (OCEAN) nasal spray 1 spray, 1 spray, Each Nare, PRN, Nadine Kapoor DO, 1 spray at 02/17/17 2106  •  sodium chloride 0.9 % bolus 1,000 mL, 1,000 mL, Intravenous, Once, Shakeel Machado MD  •  sodium chloride 0.9 % flush 1-10 mL, 1-10 mL, Intravenous, PRN, Shakeel Machado MD, 10 mL at 02/20/17 0417  •  vancomycin in dextrose 5% 150 mL (VANCOCIN) IVPB 750 mg, 750 mg, Intravenous, Q24H, Shakeel Machado MD, 750 mg at 02/19/17 2018    Assessment/Plan     Assessment/Problem List  Active Problems:    Acute exacerbation of chronic obstructive pulmonary disease (COPD)    Acute hypercapnic respiratory failure    Acute on chronic respiratory failure with hypoxia    Pneumonia of right lower lobe due to infectious organism    Cigarette nicotine dependence with nicotine-induced disorder    Plan      Assessment:     1. Acute hypercapnic respiratory failure, present on admission.  2. Acute on chronic hypoxic respiratory failure, present on admission.  3. Acute COPD exacerbation, present on admission.  4. Right lower lobe bacterial pneumonia, community-acquired, present on admission.  5. Acute atrial fibrillation with rapid ventricular rate.  6. Essential hypertension.  7. Coronary artery disease.  8. Nicotine dependence.  9. Hepatitis, uncertain etiology, possibly related to statin use.  10. Moderate malnutrition with BMI of 17.     Plan:     Acute  Hypercapnic/acute on chronic hypoxic respiratory failure  AE COPD  RLL PNA  - Continue supplemental  oxygen, bronchodilator nebulization, budesonide, IV Solu-Medrol as well as mucolytic agents.   - Azith/ceftriaxone/vanc    Atrial Fibrillation  - ASA 81mg  - Cardizem/atenolol  - cardiology following    HTN  - atenolol/cardizem/lisinopril    Hepatitis  - etiology undetermined    Check AM labs, including CMP           Sal Spann MD 02/20/17 6:58 PM

## 2017-02-20 NOTE — PLAN OF CARE
Problem: Patient Care Overview (Adult)  Goal: Plan of Care Review  Outcome: Ongoing (interventions implemented as appropriate)    02/20/17 1502   Coping/Psychosocial Response Interventions   Plan Of Care Reviewed With patient   Patient Care Overview   Progress no change         Problem: NPPV/CPAP (Adult)  Goal: Signs and Symptoms of Listed Potential Problems Will be Absent or Manageable (NPPV/CPAP)  Outcome: Ongoing (interventions implemented as appropriate)    02/20/17 1502   NPPV/CPAP   Problems Assessed (NPPV/CPAP) hypoxia/hypoxemia   Problems Present (NPPV/CPAP) none

## 2017-02-20 NOTE — PLAN OF CARE
Problem: Patient Care Overview (Adult)  Goal: Plan of Care Review  Outcome: Ongoing (interventions implemented as appropriate)  Goal: Adult Individualization and Mutuality  Outcome: Ongoing (interventions implemented as appropriate)    Problem: Pneumonia (Adult)  Goal: Signs and Symptoms of Listed Potential Problems Will be Absent or Manageable (Pneumonia)  Outcome: Ongoing (interventions implemented as appropriate)    Problem: NPPV/CPAP (Adult)  Goal: Signs and Symptoms of Listed Potential Problems Will be Absent or Manageable (NPPV/CPAP)  Outcome: Ongoing (interventions implemented as appropriate)

## 2017-02-21 ENCOUNTER — APPOINTMENT (OUTPATIENT)
Dept: GENERAL RADIOLOGY | Facility: HOSPITAL | Age: 67
End: 2017-02-21

## 2017-02-21 LAB
ALBUMIN SERPL-MCNC: 3.2 G/DL (ref 3.5–5)
ALBUMIN/GLOB SERPL: 1.4 G/DL (ref 1–2)
ALP SERPL-CCNC: 57 U/L (ref 38–126)
ALT SERPL W P-5'-P-CCNC: 121 U/L (ref 13–69)
ANION GAP SERPL CALCULATED.3IONS-SCNC: 9.7 MMOL/L
ARTERIAL PATENCY WRIST A: POSITIVE
AST SERPL-CCNC: 47 U/L (ref 15–46)
ATMOSPHERIC PRESS: 735 MMHG
BASE EXCESS BLDA CALC-SCNC: 6.8 MMOL/L
BASOPHILS # BLD AUTO: 0.01 10*3/MM3 (ref 0–0.2)
BASOPHILS NFR BLD AUTO: 0.1 % (ref 0–2.5)
BDY SITE: ABNORMAL
BILIRUB SERPL-MCNC: 0.3 MG/DL (ref 0.2–1.3)
BUN BLD-MCNC: 32 MG/DL (ref 7–20)
BUN/CREAT SERPL: 64 (ref 7.1–23.5)
CALCIUM SPEC-SCNC: 8.8 MG/DL (ref 8.4–10.2)
CHLORIDE SERPL-SCNC: 107 MMOL/L (ref 98–107)
CO2 SERPL-SCNC: 32 MMOL/L (ref 26–30)
CREAT BLD-MCNC: 0.5 MG/DL (ref 0.6–1.3)
DEPRECATED RDW RBC AUTO: 48.5 FL (ref 37–54)
EOSINOPHIL # BLD AUTO: 0 10*3/MM3 (ref 0–0.7)
EOSINOPHIL NFR BLD AUTO: 0 % (ref 0–7)
ERYTHROCYTE [DISTWIDTH] IN BLOOD BY AUTOMATED COUNT: 13.9 % (ref 11.5–14.5)
GFR SERPL CREATININE-BSD FRML MDRD: 123 ML/MIN/1.73
GLOBULIN UR ELPH-MCNC: 2.3 GM/DL
GLUCOSE BLD-MCNC: 116 MG/DL (ref 74–98)
GLUCOSE BLDC GLUCOMTR-MCNC: 146 MG/DL (ref 70–130)
HCO3 BLDA-SCNC: 31.2 MMOL/L (ref 22–28)
HCT VFR BLD AUTO: 33.2 % (ref 37–47)
HGB BLD-MCNC: 10.4 G/DL (ref 12–16)
HGB BLDA-MCNC: 10.8 G/DL (ref 12–18)
HOROWITZ INDEX BLD+IHG-RTO: 36 %
IMM GRANULOCYTES # BLD: 0.06 10*3/MM3 (ref 0–0.06)
IMM GRANULOCYTES NFR BLD: 0.7 % (ref 0–0.6)
LYMPHOCYTES # BLD AUTO: 0.55 10*3/MM3 (ref 0.6–3.4)
LYMPHOCYTES NFR BLD AUTO: 6.5 % (ref 10–50)
MCH RBC QN AUTO: 30 PG (ref 27–31)
MCHC RBC AUTO-ENTMCNC: 31.3 G/DL (ref 30–37)
MCV RBC AUTO: 95.7 FL (ref 81–99)
MODALITY: ABNORMAL
MONOCYTES # BLD AUTO: 0.28 10*3/MM3 (ref 0–0.9)
MONOCYTES NFR BLD AUTO: 3.3 % (ref 0–12)
NEUTROPHILS # BLD AUTO: 7.5 10*3/MM3 (ref 2–6.9)
NEUTROPHILS NFR BLD AUTO: 89.4 % (ref 37–80)
NRBC BLD MANUAL-RTO: 0 /100 WBC (ref 0–0)
PCO2 BLDA: 47.5 MM HG (ref 35–45)
PH BLDA: 7.43 PH UNITS
PLATELET # BLD AUTO: 293 10*3/MM3 (ref 130–400)
PMV BLD AUTO: 11.7 FL (ref 6–12)
PO2 BLDA: 65.3 MM HG (ref 75–100)
POTASSIUM BLD-SCNC: 3.7 MMOL/L (ref 3.5–5.1)
PROT SERPL-MCNC: 5.5 G/DL (ref 6.3–8.2)
RBC # BLD AUTO: 3.47 10*6/MM3 (ref 4.2–5.4)
SAO2 % BLDCOA: 94.8 %
SODIUM BLD-SCNC: 145 MMOL/L (ref 137–145)
WBC NRBC COR # BLD: 8.4 10*3/MM3 (ref 4.8–10.8)

## 2017-02-21 PROCEDURE — 94660 CPAP INITIATION&MGMT: CPT

## 2017-02-21 PROCEDURE — 80053 COMPREHEN METABOLIC PANEL: CPT | Performed by: HOSPITALIST

## 2017-02-21 PROCEDURE — 99233 SBSQ HOSP IP/OBS HIGH 50: CPT | Performed by: INTERNAL MEDICINE

## 2017-02-21 PROCEDURE — 25010000002 METHYLPREDNISOLONE PER 40 MG: Performed by: INTERNAL MEDICINE

## 2017-02-21 PROCEDURE — 71010 HC CHEST PA OR AP: CPT

## 2017-02-21 PROCEDURE — 36600 WITHDRAWAL OF ARTERIAL BLOOD: CPT

## 2017-02-21 PROCEDURE — 99232 SBSQ HOSP IP/OBS MODERATE 35: CPT | Performed by: HOSPITALIST

## 2017-02-21 PROCEDURE — 82805 BLOOD GASES W/O2 SATURATION: CPT

## 2017-02-21 PROCEDURE — 94799 UNLISTED PULMONARY SVC/PX: CPT

## 2017-02-21 PROCEDURE — 25010000002 ENOXAPARIN PER 10 MG: Performed by: INTERNAL MEDICINE

## 2017-02-21 PROCEDURE — 25010000002 CEFTRIAXONE: Performed by: INTERNAL MEDICINE

## 2017-02-21 PROCEDURE — 85025 COMPLETE CBC W/AUTO DIFF WBC: CPT | Performed by: HOSPITALIST

## 2017-02-21 PROCEDURE — 82962 GLUCOSE BLOOD TEST: CPT

## 2017-02-21 RX ORDER — LANOLIN ALCOHOL/MO/W.PET/CERES
6 CREAM (GRAM) TOPICAL NIGHTLY PRN
Status: DISCONTINUED | OUTPATIENT
Start: 2017-02-21 | End: 2017-02-26 | Stop reason: HOSPADM

## 2017-02-21 RX ADMIN — IPRATROPIUM BROMIDE AND ALBUTEROL SULFATE 3 ML: .5; 3 SOLUTION RESPIRATORY (INHALATION) at 05:55

## 2017-02-21 RX ADMIN — BUDESONIDE 0.5 MG: 0.5 INHALANT RESPIRATORY (INHALATION) at 07:09

## 2017-02-21 RX ADMIN — IPRATROPIUM BROMIDE AND ALBUTEROL SULFATE 3 ML: .5; 3 SOLUTION RESPIRATORY (INHALATION) at 21:53

## 2017-02-21 RX ADMIN — METHYLPREDNISOLONE SODIUM SUCCINATE 40 MG: 40 INJECTION, POWDER, FOR SOLUTION INTRAMUSCULAR; INTRAVENOUS at 00:56

## 2017-02-21 RX ADMIN — DILTIAZEM HYDROCHLORIDE 90 MG: 30 TABLET, FILM COATED ORAL at 00:56

## 2017-02-21 RX ADMIN — DOCUSATE SODIUM 100 MG: 100 CAPSULE, LIQUID FILLED ORAL at 08:13

## 2017-02-21 RX ADMIN — DOCUSATE SODIUM 100 MG: 100 CAPSULE, LIQUID FILLED ORAL at 17:23

## 2017-02-21 RX ADMIN — GUAIFENESIN 600 MG: 600 TABLET, EXTENDED RELEASE ORAL at 17:23

## 2017-02-21 RX ADMIN — IPRATROPIUM BROMIDE AND ALBUTEROL SULFATE 3 ML: .5; 3 SOLUTION RESPIRATORY (INHALATION) at 07:09

## 2017-02-21 RX ADMIN — LISINOPRIL 10 MG: 10 TABLET ORAL at 11:54

## 2017-02-21 RX ADMIN — DILTIAZEM HYDROCHLORIDE 90 MG: 30 TABLET, FILM COATED ORAL at 11:55

## 2017-02-21 RX ADMIN — BUDESONIDE 0.5 MG: 0.5 INHALANT RESPIRATORY (INHALATION) at 18:40

## 2017-02-21 RX ADMIN — GUAIFENESIN 600 MG: 600 TABLET, EXTENDED RELEASE ORAL at 08:13

## 2017-02-21 RX ADMIN — LORAZEPAM 1 MG: 0.5 TABLET ORAL at 08:13

## 2017-02-21 RX ADMIN — ASPIRIN 81 MG 81 MG: 81 TABLET ORAL at 08:12

## 2017-02-21 RX ADMIN — DILTIAZEM HYDROCHLORIDE 90 MG: 30 TABLET, FILM COATED ORAL at 17:23

## 2017-02-21 RX ADMIN — PANTOPRAZOLE SODIUM 40 MG: 40 TABLET, DELAYED RELEASE ORAL at 05:18

## 2017-02-21 RX ADMIN — ATENOLOL 100 MG: 50 TABLET ORAL at 08:12

## 2017-02-21 RX ADMIN — ENOXAPARIN SODIUM 40 MG: 40 INJECTION SUBCUTANEOUS at 00:56

## 2017-02-21 RX ADMIN — IPRATROPIUM BROMIDE AND ALBUTEROL SULFATE 3 ML: .5; 3 SOLUTION RESPIRATORY (INHALATION) at 18:40

## 2017-02-21 RX ADMIN — DILTIAZEM HYDROCHLORIDE 90 MG: 30 TABLET, FILM COATED ORAL at 05:18

## 2017-02-21 RX ADMIN — ENOXAPARIN SODIUM 40 MG: 40 INJECTION SUBCUTANEOUS at 11:54

## 2017-02-21 RX ADMIN — ATENOLOL 100 MG: 50 TABLET ORAL at 17:23

## 2017-02-21 RX ADMIN — NICOTINE 1 PATCH: 14 PATCH TRANSDERMAL at 12:45

## 2017-02-21 RX ADMIN — IPRATROPIUM BROMIDE AND ALBUTEROL SULFATE 3 ML: .5; 3 SOLUTION RESPIRATORY (INHALATION) at 11:59

## 2017-02-21 RX ADMIN — ESCITALOPRAM OXALATE 20 MG: 20 TABLET ORAL at 08:13

## 2017-02-21 RX ADMIN — CEFTRIAXONE 1 G: 1 INJECTION, POWDER, FOR SOLUTION INTRAMUSCULAR; INTRAVENOUS at 11:54

## 2017-02-21 RX ADMIN — METHYLPREDNISOLONE SODIUM SUCCINATE 40 MG: 40 INJECTION, POWDER, FOR SOLUTION INTRAMUSCULAR; INTRAVENOUS at 11:55

## 2017-02-21 RX ADMIN — LORAZEPAM 1 MG: 0.5 TABLET ORAL at 21:50

## 2017-02-21 RX ADMIN — PRAVASTATIN SODIUM 40 MG: 20 TABLET ORAL at 21:44

## 2017-02-21 RX ADMIN — IPRATROPIUM BROMIDE AND ALBUTEROL SULFATE 3 ML: .5; 3 SOLUTION RESPIRATORY (INHALATION) at 00:53

## 2017-02-21 RX ADMIN — LORAZEPAM 1 MG: 0.5 TABLET ORAL at 17:24

## 2017-02-21 NOTE — PLAN OF CARE
Problem: Patient Care Overview (Adult)  Goal: Plan of Care Review    02/21/17 0442   Coping/Psychosocial Response Interventions   Plan Of Care Reviewed With patient   Patient Care Overview   Progress no change         Problem: NPPV/CPAP (Adult)  Goal: Signs and Symptoms of Listed Potential Problems Will be Absent or Manageable (NPPV/CPAP)  Outcome: Ongoing (interventions implemented as appropriate)    02/21/17 0442   NPPV/CPAP   Problems Assessed (NPPV/CPAP) hypoxia/hypoxemia;skin breakdown;dry mucous membranes   Problems Present (NPPV/CPAP) none

## 2017-02-21 NOTE — PLAN OF CARE
Problem: Patient Care Overview (Adult)  Goal: Plan of Care Review  Outcome: Ongoing (interventions implemented as appropriate)    02/21/17 8072   Coping/Psychosocial Response Interventions   Plan Of Care Reviewed With patient   Outcome Evaluation   Outcome Summary/Follow up Plan pt was transfered out of ICU today, has not had to wear bipap since came to floor   Patient Care Overview   Progress improving

## 2017-02-21 NOTE — PLAN OF CARE
Problem: Patient Care Overview (Adult)  Goal: Plan of Care Review  Outcome: Ongoing (interventions implemented as appropriate)    02/21/17 1343   Coping/Psychosocial Response Interventions   Plan Of Care Reviewed With patient   Patient Care Overview   Progress improving         Problem: NPPV/CPAP (Adult)  Goal: Signs and Symptoms of Listed Potential Problems Will be Absent or Manageable (NPPV/CPAP)  Outcome: Ongoing (interventions implemented as appropriate)    02/21/17 1343   NPPV/CPAP   Problems Assessed (NPPV/CPAP) hypoxia/hypoxemia   Problems Present (NPPV/CPAP) hypoxia/hypoxemia

## 2017-02-21 NOTE — PLAN OF CARE
Problem: Patient Care Overview (Adult)  Goal: Plan of Care Review  Outcome: Ongoing (interventions implemented as appropriate)    02/21/17 0618   Coping/Psychosocial Response Interventions   Plan Of Care Reviewed With patient   Outcome Evaluation   Outcome Summary/Follow up Plan anxiety not as bad as prior 7p with pt. Continues to exhibit SWANSON.Able to get oob to [facility] with assist.   Patient Care Overview   Progress progress toward functional goals as expected         Problem: Pneumonia (Adult)  Goal: Signs and Symptoms of Listed Potential Problems Will be Absent or Manageable (Pneumonia)  Outcome: Ongoing (interventions implemented as appropriate)    Problem: NPPV/CPAP (Adult)  Goal: Signs and Symptoms of Listed Potential Problems Will be Absent or Manageable (NPPV/CPAP)  Outcome: Ongoing (interventions implemented as appropriate)

## 2017-02-21 NOTE — PROGRESS NOTES
"S: Is currently on BiPAP. Says that she feels a lot better.    O:Vital signs reviewed. I & Os reviewed. O2Sat: 98 % on BiPAP.  Visit Vitals   • /81   • Pulse 72   • Temp 97.9 °F (36.6 °C) (Oral)   • Resp 23   • Ht 63\" (160 cm)   • Wt 106 lb 9.6 oz (48.4 kg)   • SpO2 94%   • BMI 18.88 kg/m2       General: No acute distress noted.  Eyes: PERRL. EOM Sluggish   Neck: Supple  Cardiovascular: S1 + S2. Regular.   Respiratory: Mild respiratory distress noted. No significant wheezing heard. No crackles noted  Abdomen: Soft. Bowel sounds positive.   Extremities: No edema noted.  Neurologic: AAOx3. Was able to follow commands.   Skin: Appeared somewhat dry     Labs:   Lab Results   Component Value Date    PHART 7.426 02/21/2017    CGM6NLX 47.5 (H) 02/21/2017    HGBBG 10.8 (L) 02/21/2017    W0GIKJDX 94.8 02/21/2017         Assessment & Recommendations/Plan:   1. Acute hypercarbic respiratory failure  2. Unlikely Pneumonia  3. Severe COPD.  4. Continued smoking.  5. Atrial fibrillation with RVR.  6. Accelerated hypertension.    Will decrease Steroids.    ABG reviewed.     Will d/c Vanc, since no MRSA isolated. Will continue Cef+Zith for now.     Will repeat ABG in AM and if PCO2 if >52, then will consider arranging for Non Invasive Ventilation unit.         Yvon Fam MD  02/21/17  9:38 AM    "

## 2017-02-21 NOTE — CONSULTS
Joel Goetz MD  CARDIOLOGY FOLLOW UP NOTE    PATIENT: Elizabeth Fulton                                                   DATE: 17   I06/  : 1950     PRIMARY PHYSICIAN: Young Nugent MD    Reason for consult: History of atrial fibrillation with rapid ventricular response, acute and chronic hypoxemic respiratory failure, borderline cardiac markers and accelerated hypertension    Subjective: Patient has had significant improvement in her shortness of hair and she has been compliant with positive pressure breathing.  She still has significant hypoxemia based on ABGs though her hypercapnia has improved substantially.     Patient has maintained normal sinus rhythm without recurrence of breakthrough atrial fibrillation.  Patient is currently on high-dose beta blocker therapy and was started on Cardizem as well.  Blood pressure control has improved as well.   PAST MEDICAL HISTORY:     1. Atherosclerotic cardiovascular disease.  a. Peripheral arterial disease. Bilateral femoral bruits, especially right, with no definitive intermittent claudication.  b. Moderately high likelihood of underlying coronary artery disease based on risk profile and evidence of peripheral arterial disease with no prior scintigraphic or angiographic studies. On echocardiographic studies performed today, patient did not have any wall motion abnormalities.  c. Potential renovascular disease.  d. Potential mesenteric disease.  2. History of longstanding hypertension with hypertensive heart disease. Patient apparently has been hypertensive for several decades, but is not known to have definitive underlying renal parenchymal and renovascular disease. She has been on ACE inhibitor therapy and diuretic therapy.  3. Longstanding dyslipidemia.  4. History of advanced COPD with known respiratory failure, with suggestion of borderline RV function. Her RV function however was normal and right-sided filling pressures were normal as  "well.  Patient has previously undergone CT scan of the P protocol which was negative.  5. History of mild aortic sclerosis and mild mitral insufficiency.  6. Paroxysmal atrial dysrhythmia with no definitive known sustained dysrhythmias. Patient with anemia, has not been on antithrombotic therapy in the past.  7. Potential GI hemorrhage.  8. History of anxiety state and depression and history of prior colon malignancies status post chemotherapy with radiation.    PAST SURGICAL HISTORY:   1. Appendectomy.  2. Cholecystectomy.    SOCIAL HISTORY:  Patient had worked as a . She is retired and she is a . She has moved from [place] to live with her daughter in [place]. Patient continues to smoke possibly half pack to a pack per day.    FAMILY HISTORY: Not contributory    PHYSICAL EXAMINATION:  GENERAL: Pale-looking elderly female  Visit Vitals   • /81   • Pulse 72   • Temp 97.9 °F (36.6 °C) (Oral)   • Resp 23   • Ht 63\" (160 cm)   • Wt 106 lb 9.6 oz (48.4 kg)   • SpO2 94%   • BMI 18.88 kg/m2    Body mass index is 18.88 kg/(m^2). HEENT:  Head: Atraumatic, normocephalic, No tenderness over paranasal sinuses, external nares normal. No oral or nasal mucosal lesion. Sclera non-icteric ocular movements are normal with pupils reacting both to light and accommodations. Ocular fundi not seen. NECK: Carotid upstroke is normal, there are no carotid bruits, no JVD, no thyromegaly, no cervical or axillary lymphadenopathy. HEART: [place] beat is not displaced, no thrill is appreciated and both heart sounds are normal.  There is no murmur or rub audible. BRONCHOPULMONARY: Patient has markedly diminished air entry with bronchovesicular sounds. No adventious sounds audible. GI & : Soft, no tenderness elicited and no viscera are palpable. Bowel sounds are positive and there is no renal bruits audible. EXTREMITIES:  Distal vessels could not be palpated there are no femoral bruits audible. Patient does not have " dependent edema. DERM: No skin rash. CNS: No gross motor neurological deficit. MUSCULOSKELETAL: No joint swelling notice and patient has normal range of motion in lower extremity.       Intake/Output Summary (Last 24 hours) at 02/21/17 0855  Last data filed at 02/21/17 0555   Gross per 24 hour   Intake   1538 ml   Output    355 ml   Net   1183 ml     Wt Readings from Last 7 Encounters:   02/21/17 106 lb 9.6 oz (48.4 kg)     LABS:     Results from last 7 days  Lab Units 02/21/17  0446 02/19/17  0430 02/18/17  0508   WBC 10*3/mm3 8.40 7.04 8.20   HEMOGLOBIN g/dL 10.4* 9.3* 9.2*   HEMATOCRIT % 33.2* 30.0* 28.8*   PLATELETS 10*3/mm3 293 260 267                     Results from last 7 days  Lab Units 02/18/17  0508 02/17/17  1312   CK TOTAL U/L  --  32   TROPONIN I ng/mL 0.018 0.046*     No results found for: HGBA1C    Results from last 7 days  Lab Units 02/19/17  0431   TSH mIU/mL <0.015*   FREE T4 ng/dL 1.61           RADIOLOGY:   Imaging Results (last 24 hours)     ** No results found for the last 24 hours. **          No Known Allergies    aspirin 81 mg Oral Daily   atenolol 100 mg Oral BID   azithromycin 500 mg Intravenous Q24H   bisacodyl 10 mg Rectal Daily   budesonide 0.5 mg Nebulization BID - RT   ceftriaxone 1 g Intravenous Q24H   diltiaZEM 90 mg Oral Q6H   docusate sodium 100 mg Oral BID   enoxaparin 40 mg Subcutaneous Q12H   escitalopram 20 mg Oral Daily   fluticasone 2 spray Nasal Daily   guaiFENesin 600 mg Oral BID   ipratropium-albuterol 3 mL Nebulization Q6H - RT   lisinopril 10 mg Oral Daily   LORazepam 1 mg Oral BID   methylPREDNISolone sodium succinate 40 mg Intravenous Q12H   nicotine 1 patch Transdermal Q24H   pantoprazole 40 mg Oral Q AM   pravastatin 40 mg Oral Nightly   sodium chloride 1,000 mL Intravenous Once   vancomycin 750 mg Intravenous Q24H        ASSESSMENT /PLAN:    1. Patient is 66 years old-year-old  female with significant risk profile for atherosclerotic cardiovascular  disease outlined earlier, who appears to have rather advanced end-stage chronic obstructive pulmonary disease with frequent exacerbation and history of acute on chronic hypoxemic respiratory failure and early cor pulmonale, who was hospitalized on account of apparent chronic obstructive pulmonary disease exacerbation and was noticed to have atrial fibrillation with rapid ventricular response prompting multiple AV blockers.  Patient has reverted to normal sinus rhythm and has not required antiarrhythmic therapy.  Patient has not been started on antithrombotic therapy given prior history of melena.  Patient does have have several substrates for atrial fibrillation with history of occasional palpitation in the past, but there is no definitive historic data suggesting sustained dysrhythmias prior to current hospitalization and she would not necessarily merit antiarrhythmic therapy.  COPD exacerbation with acute and chronic hypoxemic and hypercapnic respiratory failure appears to be major underlying mechanism of her recurrent dysrhythmia.  As noted earlier, patient appears to have maintained sinus rhythm without any breakthrough dysrhythmia.  It would be desirable to continue the patient on AV blocker therapy preferably current dosages of up titrated beta blocker therapy given potential underlying CAD.  As her blood pressure has been markedly elevated , has been started on calcium tenderness therapy with AV blocking properties.    2. Patient, who has high likelihood of CAD, has had history of  borderline cardiac markers. In all likelihood, given suggestion of mild sepsis syndrome, it may reflect acute systemic inflammatory response versus RV strain as she does have prior history of borderline RV function. Understandably, on account of risk profile, especially given evidence of peripheral arterial disease, patient has moderate if not higher potential for underlying coronary artery disease.  However it was felt that in the  absence of a new symptomatology, she would not necessarily merit invasive workup at this stage but as an outpatient, perfusion study would still be relevant.  Patient has substantial potential for cardiovascular morbidity and mortality.  Patient would not necessarily merit long acting nitrates which has been discontinued.    3. [place], who had significant elevation of blood pressure, which may be driven by steroid therapy appears to have fairly stable  blood pressure.  She is currently on ACE inhibitor therapy, beta blocker therapy and calcium antagonist therapy.  At some juncture she may merit workup for renovascular disease.    4. Risk reduction.   is well aware of need for absolute smoking cessation .  Apparently patient has been on Chantix in the past.  5. Other issues noted include history of rather advanced chronic obstructive pulmonary disease with frequent exacerbation with acute on chronic hypoxemic respiratory failure with altered mental status secondary to hypercapnia for which patient under care of pulmonary services.  Based on recent ABGs her hypercapnia has improved substantially.  Patient was documented to have very hyperdynamic LV function with pulmonary hypertension though filling pressures appeared appropriate.  I doubt any cardiac contribution to her poor respiratory status.  6.  Additional diagnoses include chronic anxiety state and progressive weight loss conceivably reflecting mesenteric disease.                     In closing, I sincerely appreciate opportunity to participate in care of this patient. If I can be of any further assistance with the management of this patient, please don’t hesitate to contact me.    DARRELL OROZCO M.D. [facility]

## 2017-02-22 LAB
ARTERIAL PATENCY WRIST A: ABNORMAL
ARTERIAL PATENCY WRIST A: ABNORMAL
ATMOSPHERIC PRESS: 732 MMHG
ATMOSPHERIC PRESS: 732 MMHG
BACTERIA SPEC AEROBE CULT: NORMAL
BACTERIA SPEC AEROBE CULT: NORMAL
BASE EXCESS BLDA CALC-SCNC: 12.3 MMOL/L
BASE EXCESS BLDA CALC-SCNC: 12.3 MMOL/L
BDY SITE: ABNORMAL
BDY SITE: ABNORMAL
GLUCOSE BLDC GLUCOMTR-MCNC: 149 MG/DL (ref 70–130)
GLUCOSE BLDC GLUCOMTR-MCNC: 152 MG/DL (ref 70–130)
GLUCOSE BLDC GLUCOMTR-MCNC: 205 MG/DL (ref 70–130)
GLUCOSE BLDC GLUCOMTR-MCNC: 213 MG/DL (ref 70–130)
HCO3 BLDA-SCNC: 36.7 MMOL/L (ref 22–28)
HCO3 BLDA-SCNC: 36.7 MMOL/L (ref 22–28)
HGB BLDA-MCNC: 10.8 G/DL (ref 12–18)
HGB BLDA-MCNC: 10.8 G/DL (ref 12–18)
HOROWITZ INDEX BLD+IHG-RTO: 36 %
HOROWITZ INDEX BLD+IHG-RTO: 36 %
MODALITY: ABNORMAL
MODALITY: ABNORMAL
PCO2 BLDA: 55.7 MM HG (ref 35–45)
PCO2 BLDA: 55.7 MM HG (ref 35–45)
PH BLDA: 7.43 PH UNITS
PH BLDA: 7.43 PH UNITS
PO2 BLDA: 30.5 MM HG (ref 75–100)
PO2 BLDA: 30.5 MM HG (ref 75–100)
SAO2 % BLDCOA: 60.2 %
SAO2 % BLDCOA: 60.2 %

## 2017-02-22 PROCEDURE — 99232 SBSQ HOSP IP/OBS MODERATE 35: CPT | Performed by: HOSPITALIST

## 2017-02-22 PROCEDURE — 25010000002 ENOXAPARIN PER 10 MG: Performed by: INTERNAL MEDICINE

## 2017-02-22 PROCEDURE — 25010000002 METHYLPREDNISOLONE PER 40 MG: Performed by: INTERNAL MEDICINE

## 2017-02-22 PROCEDURE — 94799 UNLISTED PULMONARY SVC/PX: CPT

## 2017-02-22 PROCEDURE — 36600 WITHDRAWAL OF ARTERIAL BLOOD: CPT

## 2017-02-22 PROCEDURE — 82962 GLUCOSE BLOOD TEST: CPT

## 2017-02-22 PROCEDURE — 94660 CPAP INITIATION&MGMT: CPT

## 2017-02-22 PROCEDURE — 82805 BLOOD GASES W/O2 SATURATION: CPT

## 2017-02-22 PROCEDURE — 25010000002 CEFTRIAXONE: Performed by: INTERNAL MEDICINE

## 2017-02-22 RX ADMIN — ATENOLOL 100 MG: 50 TABLET ORAL at 09:45

## 2017-02-22 RX ADMIN — GUAIFENESIN 600 MG: 600 TABLET, EXTENDED RELEASE ORAL at 17:55

## 2017-02-22 RX ADMIN — NICOTINE 1 PATCH: 14 PATCH TRANSDERMAL at 12:34

## 2017-02-22 RX ADMIN — ENOXAPARIN SODIUM 40 MG: 40 INJECTION SUBCUTANEOUS at 23:52

## 2017-02-22 RX ADMIN — DOCUSATE SODIUM 100 MG: 100 CAPSULE, LIQUID FILLED ORAL at 17:55

## 2017-02-22 RX ADMIN — ENOXAPARIN SODIUM 40 MG: 40 INJECTION SUBCUTANEOUS at 00:02

## 2017-02-22 RX ADMIN — LORAZEPAM 1 MG: 0.5 TABLET ORAL at 09:47

## 2017-02-22 RX ADMIN — ESCITALOPRAM OXALATE 20 MG: 20 TABLET ORAL at 09:44

## 2017-02-22 RX ADMIN — ASPIRIN 81 MG 81 MG: 81 TABLET ORAL at 09:46

## 2017-02-22 RX ADMIN — IPRATROPIUM BROMIDE AND ALBUTEROL SULFATE 3 ML: .5; 3 SOLUTION RESPIRATORY (INHALATION) at 13:22

## 2017-02-22 RX ADMIN — LISINOPRIL 10 MG: 10 TABLET ORAL at 12:34

## 2017-02-22 RX ADMIN — DILTIAZEM HYDROCHLORIDE 90 MG: 30 TABLET, FILM COATED ORAL at 23:15

## 2017-02-22 RX ADMIN — PRAVASTATIN SODIUM 40 MG: 20 TABLET ORAL at 20:24

## 2017-02-22 RX ADMIN — DILTIAZEM HYDROCHLORIDE 90 MG: 30 TABLET, FILM COATED ORAL at 12:34

## 2017-02-22 RX ADMIN — DOCUSATE SODIUM 100 MG: 100 CAPSULE, LIQUID FILLED ORAL at 09:45

## 2017-02-22 RX ADMIN — GUAIFENESIN 600 MG: 600 TABLET, EXTENDED RELEASE ORAL at 09:45

## 2017-02-22 RX ADMIN — DILTIAZEM HYDROCHLORIDE 90 MG: 30 TABLET, FILM COATED ORAL at 06:23

## 2017-02-22 RX ADMIN — METHYLPREDNISOLONE SODIUM SUCCINATE 40 MG: 40 INJECTION, POWDER, FOR SOLUTION INTRAMUSCULAR; INTRAVENOUS at 12:34

## 2017-02-22 RX ADMIN — IPRATROPIUM BROMIDE AND ALBUTEROL SULFATE 3 ML: .5; 3 SOLUTION RESPIRATORY (INHALATION) at 19:13

## 2017-02-22 RX ADMIN — FLUTICASONE PROPIONATE 2 SPRAY: 50 SPRAY, METERED NASAL at 09:47

## 2017-02-22 RX ADMIN — METHYLPREDNISOLONE SODIUM SUCCINATE 40 MG: 40 INJECTION, POWDER, FOR SOLUTION INTRAMUSCULAR; INTRAVENOUS at 00:02

## 2017-02-22 RX ADMIN — LORAZEPAM 1 MG: 0.5 TABLET ORAL at 20:31

## 2017-02-22 RX ADMIN — ENOXAPARIN SODIUM 40 MG: 40 INJECTION SUBCUTANEOUS at 12:33

## 2017-02-22 RX ADMIN — ATENOLOL 100 MG: 50 TABLET ORAL at 17:55

## 2017-02-22 RX ADMIN — LORAZEPAM 1 MG: 0.5 TABLET ORAL at 17:55

## 2017-02-22 RX ADMIN — BUDESONIDE 0.5 MG: 0.5 INHALANT RESPIRATORY (INHALATION) at 19:13

## 2017-02-22 RX ADMIN — CEFTRIAXONE 1 G: 1 INJECTION, POWDER, FOR SOLUTION INTRAMUSCULAR; INTRAVENOUS at 12:39

## 2017-02-22 RX ADMIN — IPRATROPIUM BROMIDE AND ALBUTEROL SULFATE 3 ML: .5; 3 SOLUTION RESPIRATORY (INHALATION) at 01:04

## 2017-02-22 RX ADMIN — IPRATROPIUM BROMIDE AND ALBUTEROL SULFATE 3 ML: .5; 3 SOLUTION RESPIRATORY (INHALATION) at 04:47

## 2017-02-22 RX ADMIN — IPRATROPIUM BROMIDE AND ALBUTEROL SULFATE 3 ML: .5; 3 SOLUTION RESPIRATORY (INHALATION) at 07:19

## 2017-02-22 RX ADMIN — PANTOPRAZOLE SODIUM 40 MG: 40 TABLET, DELAYED RELEASE ORAL at 06:23

## 2017-02-22 RX ADMIN — DILTIAZEM HYDROCHLORIDE 90 MG: 30 TABLET, FILM COATED ORAL at 00:03

## 2017-02-22 RX ADMIN — DILTIAZEM HYDROCHLORIDE 90 MG: 30 TABLET, FILM COATED ORAL at 17:55

## 2017-02-22 RX ADMIN — BUDESONIDE 0.5 MG: 0.5 INHALANT RESPIRATORY (INHALATION) at 07:21

## 2017-02-22 RX ADMIN — METHYLPREDNISOLONE SODIUM SUCCINATE 40 MG: 40 INJECTION, POWDER, FOR SOLUTION INTRAMUSCULAR; INTRAVENOUS at 23:15

## 2017-02-22 NOTE — PLAN OF CARE
Problem: Patient Care Overview (Adult)  Goal: Plan of Care Review  Outcome: Ongoing (interventions implemented as appropriate)  Goal: Discharge Needs Assessment  Outcome: Ongoing (interventions implemented as appropriate)    Problem: Pneumonia (Adult)  Goal: Signs and Symptoms of Listed Potential Problems Will be Absent or Manageable (Pneumonia)  Outcome: Ongoing (interventions implemented as appropriate)

## 2017-02-22 NOTE — PLAN OF CARE
Problem: NPPV/CPAP (Adult)  Intervention: Monitor/Manage Anxiety Related to NPPV/CPAP    02/21/17 2251   Coping Strategies   Trust Relationship/Rapport care explained;empathic listening provided       Intervention: Prevent Aspiration During Therapy    02/21/17 2251   Positioning   Head Of Bed (HOB) Position HOB elevated         Goal: Signs and Symptoms of Listed Potential Problems Will be Absent or Manageable (NPPV/CPAP)    02/21/17 2251   NPPV/CPAP   Problems Assessed (NPPV/CPAP) dry mucous membranes;situational response;skin breakdown;hypoxia/hypoxemia   Problems Present (NPPV/CPAP) none

## 2017-02-22 NOTE — PROGRESS NOTES
INPATIENT PROGRESS NOTE    Date of Admission: 2/17/2017  Length of Stay: 4  Primary Care Physician: Young Nugent DO    Subjective   Chief Complaint:  Dyspnea  HPI: This is a 66-year-old female with history of COPD on home oxygen at 3 L, coronary artery disease, hypertension was transferred from [facility] emergency room with acute hypoxic and hypercapnic respiratory failure. The history is obtained from the patient, her daughter as well as the medical chart.     The patient states that she was in her usual state of health until a few days ago when she started having increasing shortness of breath and cough. She continued to use nebulizers and oxygen at home without any improvement. Early this morning around 3 AM she got significantly worse and EMS was called. Patient was taken to the emergency room after she received albuterol nebulization and IV Solu-Medrol by the EMS. She was evaluated in the emergency room and her initial temperature was 99.6. Blood pressure was 221/108 and pulse was 103. She was saturating at 84% with a respiratory rate of 28. Patient was given duo neb and IV Levaquin. Her white count was noted to be 29,000. Her troponin was negative. Chest x-ray showed possible right lower lobe pneumonia. ABG done in the emergency room showed hypercapnia with a PCO2 of 74. She was placed on BiPAP with improvement in her CO2 levels. She was given 1 L of normal saline. Due to unavailability of ICU beds, she was transferred to [facility] intensive care unit.     Patient has history of COPD and has been on home oxygen for the last 2 years. She has duoneb at home and also uses Symbicort and Spiriva. She does not follow-up with pulmonology. She lives with her daughter and is independent in daily activities. She does not use CPAP at home.     Patient is currently comfortable on BiPAP. She does have history of paroxysmal atrial fibrillation but is not on any anticoagulation. She  was admitted to [facility] in December 2016 for COPD exacerbation and at that time she was told to have had a mild heart attack. She was supposed to follow-up with Dr. Goetz which she has not been able to.     After about an hour being in the ICU, she developed atrial fibrillation with rapid ventricular rate with her heart rate in the 170s. She was given IV Cardizem and has been started on oral Cardizem.      Interval History:  02/21/17 - Ms. Fulton was seen and examined x 2 in the ICU today. Patient remains essentially biPAP dependent at this point.  She is cachetic.  She denies needs.  No f/c/ns/chest pain.    Review Of Systems:   Review of Systems    Objective      Temp:  [97.8 °F (36.6 °C)-98.1 °F (36.7 °C)] 98 °F (36.7 °C)  Heart Rate:  [53-72] 66  Resp:  [16-23] 20  BP: (123-155)/(65-90) 140/70  Gen: Alert, appropriate, pleasant and interactive  HEENT: EOMI, ATNC, MMM  Neck: Supple  Heart: S1S2, IRRR  Lungs: decreased with occ exp wheeze  Abdomen: Soft, NTND, BS+  Extremities: Warm, well-perfused, + pulses  Skin: P/W/D  Neuro: A/O x3, speech clear    Results Review:    I have reviewed the labs, radiology results and diagnostic studies.    Lab Results (last 24 hours)     Procedure Component Value Units Date/Time    CBC & Differential [[id number]] Collected:  02/21/17 0446    Specimen:  Blood Updated:  02/21/17 0530    Narrative:       The following orders were created for panel order CBC & Differential.  Procedure                               Abnormality         Status                     ---------                               -----------         ------                     CBC Auto Differential[[id number]]         Abnormal            Final result                 Please view results for these tests on the individual orders.    CBC Auto Differential [[id number]]  (Abnormal) Collected:  02/21/17 0446    Specimen:  Blood Updated:  02/21/17 0530     WBC 8.40 10*3/mm3      RBC 3.47 (L) 10*6/mm3       Hemoglobin 10.4 (L) g/dL      Hematocrit 33.2 (L) %      MCV 95.7 fL      MCH 30.0 pg      MCHC 31.3 g/dL      RDW 13.9 %      RDW-SD 48.5 fl      MPV 11.7 fL      Platelets 293 10*3/mm3      Neutrophil % 89.4 (H) %      Lymphocyte % 6.5 (L) %      Monocyte % 3.3 %      Eosinophil % 0.0 %      Basophil % 0.1 %      Immature Grans % 0.7 (H) %      Neutrophils, Absolute 7.50 (H) 10*3/mm3      Lymphocytes, Absolute 0.55 (L) 10*3/mm3      Monocytes, Absolute 0.28 10*3/mm3      Eosinophils, Absolute 0.00 10*3/mm3      Basophils, Absolute 0.01 10*3/mm3      Immature Grans, Absolute 0.06 10*3/mm3      nRBC 0.0 /100 WBC     Comprehensive Metabolic Panel [[id number]]  (Abnormal) Collected:  02/21/17 0446    Specimen:  Blood Updated:  02/21/17 0552     Glucose 116 (H) mg/dL      BUN 32 (H) mg/dL      Creatinine 0.50 (L) mg/dL      Sodium 145 mmol/L      Potassium 3.7 mmol/L      Chloride 107 mmol/L      CO2 32.0 (H) mmol/L      Calcium 8.8 mg/dL      Total Protein 5.5 (L) g/dL      Albumin 3.20 (L) g/dL      ALT (SGPT) 121 (H) U/L      AST (SGOT) 47 (H) U/L      Alkaline Phosphatase 57 U/L      Total Bilirubin 0.3 mg/dL      eGFR Non African Amer 123 mL/min/1.73      Globulin 2.3 gm/dL      A/G Ratio 1.4 g/dL      BUN/Creatinine Ratio 64.0 (H)      Anion Gap 9.7 mmol/L     Narrative:       Abnormal estimated GFR should be followed by more specific studies to confirm end stage chronic renal disease. The equation used for calculation may not be accurate for patients less than 19 years old, greater than 70 years old, patients at extremes of weight, malnutrition, or with acute renal dysfunction.    Blood Gas, Arterial [[id number]]  (Abnormal) Collected:  02/21/17 0635    Specimen:  Arterial Blood Updated:  02/21/17 0635     Site Arterial: right brachial      Bassam's Test Positive      pH, Arterial 7.426 pH units      pCO2, Arterial 47.5 (H) mm Hg      pO2, Arterial 65.3 (L) mm Hg      HCO3, Arterial 31.2 (H) mmol/L      Base Excess,  Arterial 6.8 mmol/L      O2 Saturation, Arterial 94.8 %      Hemoglobin, Blood Gas 10.8 (L) g/dL      Barometric Pressure for Blood Gas 735 mmHg      Modality Cannula - Nasal      FIO2 36 %     Blood Culture [[id number]]  (Normal) Collected:  02/17/17 1311    Specimen:  Blood from Hand, Left Updated:  02/21/17 1401     Blood Culture No growth at 4 days     Blood Culture [[id number]]  (Normal) Collected:  02/17/17 1312    Specimen:  Blood from Hand, Left Updated:  02/21/17 1401     Blood Culture No growth at 4 days           Culture Data:   BLOOD CULTURE   Date Value Ref Range Status   02/17/2017 No growth at 3 days  Preliminary              MRSA SCREEN CX   Date Value Ref Range Status   02/17/2017   Final    No Methicillin Resistant Staphylococcus aureus isolated                Imaging Results (most recent)     Procedure Component Value Units Date/Time    XR Chest 1 View [[id number]] Collected:  02/18/17 0830     Updated:  02/18/17 0834    Narrative:       PROCEDURE: XR CHEST 1 VW-     HISTORY: Pneumonia.     COMPARISON: None.      FINDINGS:    . The heart is normal in size. The mediastinum is  unremarkable. The lungs are hyperinflated consistent with COPD. There  are mild right lung base opacities which may represent pneumonia or  atelectasis.. There is no pneumothorax. The bony thorax in intact.           Impression:       COPD.     Right lung base pneumonia or atelectasis..        This report was finalized on 2/18/2017 8:32 AM by Nestor Cardenas MD.          I have reviewed the medications.      Current Facility-Administered Medications:   •  acetaminophen (TYLENOL) tablet 650 mg, 650 mg, Oral, Q4H PRN, Shakeel Machado MD  •  ammonium lactate (LAC-HYDRIN) 12 % lotion, , Topical, BID PRN, Shakeel Machado MD  •  aspirin chewable tablet 81 mg, 81 mg, Oral, Daily, Shakeel Machado MD, 81 mg at 02/21/17 0812  •  atenolol (TENORMIN) tablet 100 mg, 100 mg, Oral, BID, Joel Goetz MD, 100 mg at 02/21/17 1723  •  budesonide (PULMICORT)  nebulizer solution 0.5 mg, 0.5 mg, Nebulization, BID - RT, Shakeel Machado MD, 0.5 mg at 02/21/17 1840  •  cefTRIAXone (ROCEPHIN) 1 g/50 mL 0.9% NS VTB (mbp), 1 g, Intravenous, Q24H, Shakeel Machado MD, 1 g at 02/21/17 1154  •  CHAPSTICK 1 each, 1 each, Apply externally, PRN, Shakeel Machado MD, 1 each at 02/20/17 2032  •  diltiaZEM (CARDIZEM) tablet 90 mg, 90 mg, Oral, Q6H, Joel Goetz MD, 90 mg at 02/21/17 1723  •  docusate sodium (COLACE) capsule 100 mg, 100 mg, Oral, BID, Shakeel Machado MD, 100 mg at 02/21/17 1723  •  enoxaparin (LOVENOX) syringe 40 mg, 40 mg, Subcutaneous, Q12H, Shakeel Machado MD, 40 mg at 02/21/17 1154  •  escitalopram (LEXAPRO) tablet 20 mg, 20 mg, Oral, Daily, Shakeel Machado MD, 20 mg at 02/21/17 0813  •  fluticasone (FLONASE) 50 MCG/ACT nasal spray 2 spray, 2 spray, Nasal, Daily, Shakeel Machado MD, 2 spray at 02/20/17 0810  •  guaiFENesin (MUCINEX) 12 hr tablet 600 mg, 600 mg, Oral, BID, Shakeel Machado MD, 600 mg at 02/21/17 1723  •  hydrALAZINE (APRESOLINE) injection 10 mg, 10 mg, Intravenous, Q6H PRN, Nadine Kapoor DO, 10 mg at 02/20/17 0846  •  ipratropium-albuterol (DUO-NEB) nebulizer solution 3 mL, 3 mL, Nebulization, Q4H PRN, Shakeel Machado MD, 3 mL at 02/21/17 0555  •  ipratropium-albuterol (DUO-NEB) nebulizer solution 3 mL, 3 mL, Nebulization, Q6H - RT, Shakeel Machado MD, 3 mL at 02/21/17 1840  •  lisinopril (PRINIVIL,ZESTRIL) tablet 10 mg, 10 mg, Oral, Daily, Yvon Fam MD, 10 mg at 02/21/17 1154  •  LORazepam (ATIVAN) tablet 1 mg, 1 mg, Oral, BID, Shakeel Machado MD, 1 mg at 02/21/17 1724  •  LORazepam (ATIVAN) tablet 1 mg, 1 mg, Oral, Nightly PRN, Nadine Kapoor DO, 1 mg at 02/20/17 2213  •  melatonin tablet 6 mg, 6 mg, Oral, Nightly PRN, Sal Spann MD  •  methylPREDNISolone sodium succinate (SOLU-Medrol) injection 40 mg, 40 mg, Intravenous, Q12H, Shakeel Machado MD, 40 mg at 02/21/17 1155  •  nicotine (NICODERM CQ) 14 MG/24HR patch 1 patch, 1 patch, Transdermal, Q24H, Shakeel Machado,  MD, 1 patch at 02/21/17 1245  •  ondansetron (ZOFRAN) tablet 4 mg, 4 mg, Oral, Q6H PRN **OR** ondansetron ODT (ZOFRAN-ODT) disintegrating tablet 4 mg, 4 mg, Oral, Q6H PRN **OR** ondansetron (ZOFRAN) injection 4 mg, 4 mg, Intravenous, Q6H PRN, Shakeel Machado MD  •  pantoprazole (PROTONIX) EC tablet 40 mg, 40 mg, Oral, Q AM, Shakeel Machado MD, 40 mg at 02/21/17 0518  •  pravastatin (PRAVACHOL) tablet 40 mg, 40 mg, Oral, Nightly, Shakeel Machado MD, 40 mg at 02/20/17 2026  •  sodium chloride (OCEAN) nasal spray 1 spray, 1 spray, Each Nare, PRN, Nadine Kapoor, , 1 spray at 02/17/17 2106  •  sodium chloride 0.9 % bolus 1,000 mL, 1,000 mL, Intravenous, Once, Shakeel Machado MD  •  sodium chloride 0.9 % flush 1-10 mL, 1-10 mL, Intravenous, PRN, Shakeel Machado MD, 10 mL at 02/20/17 2220    Assessment/Plan     Assessment/Problem List  Active Problems:    Acute exacerbation of chronic obstructive pulmonary disease (COPD)    Acute hypercapnic respiratory failure    Acute on chronic respiratory failure with hypoxia    Pneumonia of right lower lobe due to infectious organism    Cigarette nicotine dependence with nicotine-induced disorder    Plan      Assessment:     1. Acute hypercapnic respiratory failure, present on admission.  2. Acute on chronic hypoxic respiratory failure, present on admission.  3. Acute COPD exacerbation, present on admission.  4. Right lower lobe bacterial pneumonia, community-acquired, present on admission.  5. Acute atrial fibrillation with rapid ventricular rate.  6. Essential hypertension.  7. Coronary artery disease.  8. Nicotine dependence.  9. Hepatitis, uncertain etiology, possibly related to statin use.  10. Moderate malnutrition with BMI of 17.     Plan:     Acute  Hypercapnic/acute on chronic hypoxic respiratory failure  AE COPD  RLL PNA  - Continue supplemental  oxygen, bronchodilator nebulization, budesonide, IV Solu-Medrol as well as mucolytic agents.   - Azith/ceftriaxone/vanc    Atrial  Fibrillation  - ASA 81mg  - Cardizem/atenolol  - cardiology following    HTN  - atenolol/cardizem/lisinopril    Hepatitis  - etiology undetermined    Check AM labs, including CMP           Sal Spann MD 02/21/17 8:22 PM

## 2017-02-22 NOTE — PLAN OF CARE
Problem: Patient Care Overview (Adult)  Goal: Plan of Care Review  Outcome: Ongoing (interventions implemented as appropriate)    02/22/17 1509   Coping/Psychosocial Response Interventions   Plan Of Care Reviewed With patient   Outcome Evaluation   Outcome Summary/Follow up Plan No s/s of acute distress today   Patient Care Overview   Progress improving       Goal: Adult Individualization and Mutuality  Outcome: Ongoing (interventions implemented as appropriate)  Goal: Discharge Needs Assessment  Outcome: Ongoing (interventions implemented as appropriate)    Problem: Pneumonia (Adult)  Goal: Signs and Symptoms of Listed Potential Problems Will be Absent or Manageable (Pneumonia)  Outcome: Ongoing (interventions implemented as appropriate)    Problem: NPPV/CPAP (Adult)  Goal: Signs and Symptoms of Listed Potential Problems Will be Absent or Manageable (NPPV/CPAP)  Outcome: Ongoing (interventions implemented as appropriate)

## 2017-02-22 NOTE — PROGRESS NOTES
LOS: 5 days   Patient Care Team:  Young Nugent DO as PCP - General (Family Medicine)    Chief Complaint:  Dyspnea    Subjective     This is a 66-year-old female with history of COPD on home oxygen at 3 L, coronary artery disease, hypertension was transferred from [facility] emergency room with acute hypoxic and hypercapnic respiratory failure. The history is obtained from the patient, her daughter as well as the medical chart.     The patient states that she was in her usual state of health until a few days ago when she started having increasing shortness of breath and cough. She continued to use nebulizers and oxygen at home without any improvement. Early this morning around 3 AM she got significantly worse and EMS was called. Patient was taken to the emergency room after she received albuterol nebulization and IV Solu-Medrol by the EMS. She was evaluated in the emergency room and her initial temperature was 99.6. Blood pressure was 221/108 and pulse was 103. She was saturating at 84% with a respiratory rate of 28. Patient was given duo neb and IV Levaquin. Her white count was noted to be 29,000. Her troponin was negative. Chest x-ray showed possible right lower lobe pneumonia. ABG done in the emergency room showed hypercapnia with a PCO2 of 74. She was placed on BiPAP with improvement in her CO2 levels. She was given 1 L of normal saline. Due to unavailability of ICU beds, she was transferred to [facility] intensive care unit.     Patient has history of COPD and has been on home oxygen for the last 2 years. She has duoneb at home and also uses Symbicort and Spiriva. She does not follow-up with pulmonology. She lives with her daughter and is independent in daily activities. She does not use CPAP at home.     Patient is resting comfortably on Oxygen. Patient is wearing BIPAP intermittently. She denies sob or chest pain. She does have moderate sob on exertion. Patient wants  BIPAP for home.  Interval History:     Patient Complaints: Shortness of breath on exertion  Patient Denies:  Chest pain, abdominal pain, chills, headaches.   History taken from: patient    Review of Systems:   Constitutional: denies chills, denies fever  HEENT: Denies headaches. Denies throat issues  Cardiovascular: denies chest pain, denies palpitations  Pulmonary: mild shortness of breath  Gastroenterology:denies abdominal pain, denies nausea, denies vomiting      Objective     Vital Signs  Temp:  [97.6 °F (36.4 °C)-97.7 °F (36.5 °C)] 97.6 °F (36.4 °C)  Heart Rate:  [66-76] 76  Resp:  [20-21] 20  BP: (140-155)/(70-88) 155/87    Physical Exam:   General Appearance: alert, appears stated age and cooperative  Head: normocephalic, without obvious abnormality and atraumatic  Lungs: accessory muscle use and wheezes bilateral upper and lower  Heart: regular rhythm & normal rate and normal S1, S2  Abdomen: normal bowel sounds, no masses and soft non-tender  Extremities: moves extremities well, no edema, no cyanosis and no redness  Neurologic: Speech normal content and execusion  Psych: normal     Results Review:     I reviewed the patient's new clinical results.    Medication Review:     Assessment/Plan     Active Problems:    Acute exacerbation of chronic obstructive pulmonary disease (COPD)    Acute hypercapnic respiratory failure    Acute on chronic respiratory failure with hypoxia    Pneumonia of right lower lobe due to infectious organism    Cigarette nicotine dependence with nicotine-induced disorder    Acute Hypercapnic respiratory failure  Right lower lobe pneumonia  COPD: continue supplemental oxygen, bronchodilator nebulization  Azithromycin/ceftriazone/vancomycin    Atrial fibrillation: ASA 81mg, Cardizem/atenolol, cardiology following    HTN:  Atenolol, lisinopril, cardizem    Check AM labs, cmp, cbc     Hemnishil Marella  02/22/17  12:08 PM    I have reviewed the notes, assessments, and/or procedures  performed by Audra Haley, Medical Student,, I concur with her/his documentation of Elizabeth Fulton.

## 2017-02-22 NOTE — PROGRESS NOTES
Continued Stay Note  MANJULA Roberson     Patient Name: Elizabeth Fulton  MRN: [id number]  Today's Date: 2/22/2017    Admit Date: 2/17/2017          Discharge Plan       02/22/17 0819    Case Management/Social Work Plan    Plan Note for 02/21/17:  Pt on NC.  Anticipate transfer to Med/Surg/Tele unit.  CM to continue to follow.              Discharge Codes     None            Augustina Hurst

## 2017-02-22 NOTE — CONSULTS
Joel Goetz MD  CARDIOLOGY FOLLOW UP NOTE    PATIENT: Elizabeth Fulton                                                   DATE: 17   306/1  : 1950     PRIMARY PHYSICIAN: Young Nugent MD    Reason for consult: History of atrial fibrillation with rapid ventricular response, acute and chronic hypoxemic respiratory failure, borderline cardiac markers and accelerated hypertension    Subjective: Continues to experience recurrent episodes of rather acute shortness of hair including earlier this morning prompting urgent resumption of CPAP.  Apparently overnight and she has been compliant with positive pressure breathing.  She still has significant hypoxemia based on ABGs though her hypercapnia has improved substantially.     Patient has maintained normal sinus rhythm without recurrence of breakthrough atrial fibrillation despite recurrent episodes of acute shortness of hair.  Patient is currently on high-dose beta blocker therapy and was started on Cardizem as well.  Blood pressure control has improved as well.     Patient has not experienced any chest pain, pressure or tightness.  However patient has been very restricted and has not ambulated any significant distances.    PAST MEDICAL HISTORY:     1. Atherosclerotic cardiovascular disease.  a. Peripheral arterial disease. Bilateral femoral bruits, especially right, with no definitive intermittent claudication.  b. Moderately high likelihood of underlying coronary artery disease based on risk profile and evidence of peripheral arterial disease with no prior scintigraphic or angiographic studies. On echocardiographic studies performed today, patient did not have any wall motion abnormalities.  c. Potential renovascular disease.  d. Potential mesenteric disease.  2. History of longstanding hypertension with hypertensive heart disease. Patient apparently has been hypertensive for several decades, but is not known to have definitive underlying renal  "parenchymal and renovascular disease. She has been on ACE inhibitor therapy and diuretic therapy.  3. Longstanding dyslipidemia.  4. History of advanced COPD with known respiratory failure, with suggestion of borderline RV function. Her RV function however was normal and right-sided filling pressures were normal as well.  Patient has previously undergone CT scan of the P protocol which was negative.  5. History of mild aortic sclerosis and mild mitral insufficiency.  6. Paroxysmal atrial dysrhythmia with no definitive known sustained dysrhythmias. Patient with anemia, has not been on antithrombotic therapy in the past.  7. Potential GI hemorrhage.  8. History of anxiety state and depression and history of prior colon malignancies status post chemotherapy with radiation.    PAST SURGICAL HISTORY:   1. Appendectomy.  2. Cholecystectomy.    SOCIAL HISTORY:  Patient had worked as a . She is retired and she is a . She has moved from [place] to live with her daughter in [place]. Patient continues to smoke possibly half pack to a pack per day.    FAMILY HISTORY: Not contributory    PHYSICAL EXAMINATION:  GENERAL: Pale-looking elderly female  Visit Vitals   • /87   • Pulse 74   • Temp 97.6 °F (36.4 °C) (Oral)   • Resp 20   • Ht 63\" (160 cm)   • Wt 105 lb 3 oz (47.7 kg)   • SpO2 98%   • BMI 18.63 kg/m2    Body mass index is 18.63 kg/(m^2). HEENT:  Head: Atraumatic, normocephalic, No tenderness over paranasal sinuses, external nares normal. No oral or nasal mucosal lesion. Sclera non-icteric ocular movements are normal with pupils reacting both to light and accommodations. Ocular fundi not seen. NECK: Carotid upstroke is normal, there are no carotid bruits, no JVD, no thyromegaly, no cervical or axillary lymphadenopathy. HEART: [place] beat is not displaced, no thrill is appreciated and both heart sounds are normal.  There is no murmur or rub audible. BRONCHOPULMONARY: Patient has markedly diminished " air entry with bronchovesicular sounds. No adventious sounds audible. GI & : Soft, no tenderness elicited and no viscera are palpable. Bowel sounds are positive and there is no renal bruits audible. EXTREMITIES:  Distal vessels could not be palpated there are no femoral bruits audible. Patient does not have dependent edema. DERM: No skin rash. CNS: No gross motor neurological deficit. MUSCULOSKELETAL: No joint swelling notice and patient has normal range of motion in lower extremity.       Intake/Output Summary (Last 24 hours) at 02/22/17 0930  Last data filed at 02/21/17 2230   Gross per 24 hour   Intake   1054 ml   Output    550 ml   Net    504 ml     Wt Readings from Last 7 Encounters:   02/22/17 105 lb 3 oz (47.7 kg)     LABS:     Results from last 7 days  Lab Units 02/21/17  0446 02/19/17  0430 02/18/17  0508   WBC 10*3/mm3 8.40 7.04 8.20   HEMOGLOBIN g/dL 10.4* 9.3* 9.2*   HEMATOCRIT % 33.2* 30.0* 28.8*   PLATELETS 10*3/mm3 293 260 267                     Results from last 7 days  Lab Units 02/18/17  0508 02/17/17  1312   CK TOTAL U/L  --  32   TROPONIN I ng/mL 0.018 0.046*     No results found for: HGBA1C    Results from last 7 days  Lab Units 02/19/17  0431   TSH mIU/mL <0.015*   FREE T4 ng/dL 1.61           RADIOLOGY:   Imaging Results (last 24 hours)     ** No results found for the last 24 hours. **          No Known Allergies    aspirin 81 mg Oral Daily   atenolol 100 mg Oral BID   budesonide 0.5 mg Nebulization BID - RT   ceftriaxone 1 g Intravenous Q24H   diltiaZEM 90 mg Oral Q6H   docusate sodium 100 mg Oral BID   enoxaparin 40 mg Subcutaneous Q12H   escitalopram 20 mg Oral Daily   fluticasone 2 spray Nasal Daily   guaiFENesin 600 mg Oral BID   ipratropium-albuterol 3 mL Nebulization Q6H - RT   lisinopril 10 mg Oral Daily   LORazepam 1 mg Oral BID   methylPREDNISolone sodium succinate 40 mg Intravenous Q12H   nicotine 1 patch Transdermal Q24H   pantoprazole 40 mg Oral Q AM   pravastatin 40 mg Oral  Nightly   sodium chloride 1,000 mL Intravenous Once     ASSESSMENT /PLAN:    1. Patient is 66 years old-year-old  female with significant risk profile for atherosclerotic cardiovascular disease outlined earlier, who appears to have rather advanced end-stage chronic obstructive pulmonary disease with frequent exacerbation and history of acute on chronic hypoxemic respiratory failure and early cor pulmonale, who was hospitalized on account of apparent chronic obstructive pulmonary disease exacerbation and was noticed to have atrial fibrillation with rapid ventricular response prompting multiple AV blockers.  Patient has maintained normal sinus rhythm without any antiarrhythmic therapy.  Patient has not been started on antithrombotic therapy given prior history of melena.  Patient does have have several substrates for atrial fibrillation with history of occasional palpitation in the past.  Although there is no historic data suggesting sustained dysrhythmias prior to current hospitalization , it is very likely that the patient would develop recurrent dysrhythmia on that this stage she would not merit antiarrhythmic therapy.  Patient is currently on AV blocker therapy including high-dose beta blocker therapy given potential underlying CAD.  As her blood pressure has been markedly elevated , patient has been started on calcium tenderness therapy with AV blocking properties.    2. Patient, who has high likelihood of underlying coronary artery disease, has had history of  borderline cardiac markers. In all likelihood, given suggestion of mild sepsis syndrome, it may reflect acute systemic inflammatory response versus RV strain as she does have prior history of borderline RV function. Understandably, on account of risk profile, especially given evidence of peripheral arterial disease, patient has moderate if not higher potential for underlying coronary artery disease.  However it was felt that in the absence of a  new symptomatology, she would not necessarily merit invasive workup at this stage but as an outpatient, perfusion study would still be relevant.  Patient has substantial potential for cardiovascular morbidity and mortality.  Patient would not necessarily merit long acting nitrates which has been discontinued.    3. Elvin, who had significant elevation of blood pressure, which may be driven by steroid therapy appears to have fairly stable  blood pressure.  She is currently on ACE inhibitor therapy, beta blocker therapy and calcium antagonist therapy.  At some juncture she may merit workup for renovascular disease.  Her blood pressure control has improved substantially.   4. Risk reduction.  Patient is well aware of need for absolute smoking cessation .  Apparently patient has been on Chantix in the past.  5. Other issues noted include history of rather advanced chronic obstructive pulmonary disease with frequent exacerbation with acute on chronic hypoxemic respiratory failure with altered mental status secondary to hypercapnia for which patient under care of pulmonary services.  Based on recent ABGs her hypercapnia has improved substantially.  Patient was documented to have very hyperdynamic LV function with pulmonary hypertension though filling pressures appeared appropriate.  I doubt any cardiac contribution to her poor respiratory status.  6.  Additional diagnoses include chronic anxiety state for which she may merit more aggressive sedative regimen.                     In closing, I sincerely appreciate opportunity to participate in care of this patient. If I can be of any further assistance with the management of this patient, please don’t hesitate to contact me.    DARRELL OROZCO M.D. [facility]

## 2017-02-23 LAB
ALBUMIN SERPL-MCNC: 3.2 G/DL (ref 3.5–5)
ALBUMIN/GLOB SERPL: 1.3 G/DL (ref 1–2)
ALP SERPL-CCNC: 52 U/L (ref 38–126)
ALT SERPL W P-5'-P-CCNC: 78 U/L (ref 13–69)
ANION GAP SERPL CALCULATED.3IONS-SCNC: 10.1 MMOL/L
AST SERPL-CCNC: 34 U/L (ref 15–46)
BILIRUB SERPL-MCNC: 0.4 MG/DL (ref 0.2–1.3)
BUN BLD-MCNC: 31 MG/DL (ref 7–20)
BUN/CREAT SERPL: 77.5 (ref 7.1–23.5)
CALCIUM SPEC-SCNC: 8.7 MG/DL (ref 8.4–10.2)
CHLORIDE SERPL-SCNC: 103 MMOL/L (ref 98–107)
CO2 SERPL-SCNC: 35 MMOL/L (ref 26–30)
CREAT BLD-MCNC: 0.4 MG/DL (ref 0.6–1.3)
GFR SERPL CREATININE-BSD FRML MDRD: >150 ML/MIN/1.73
GLOBULIN UR ELPH-MCNC: 2.4 GM/DL
GLUCOSE BLD-MCNC: 116 MG/DL (ref 74–98)
POTASSIUM BLD-SCNC: 4.1 MMOL/L (ref 3.5–5.1)
PROT SERPL-MCNC: 5.6 G/DL (ref 6.3–8.2)
SODIUM BLD-SCNC: 144 MMOL/L (ref 137–145)

## 2017-02-23 PROCEDURE — 99231 SBSQ HOSP IP/OBS SF/LOW 25: CPT | Performed by: HOSPITALIST

## 2017-02-23 PROCEDURE — 94799 UNLISTED PULMONARY SVC/PX: CPT

## 2017-02-23 PROCEDURE — 25010000002 CEFTRIAXONE: Performed by: INTERNAL MEDICINE

## 2017-02-23 PROCEDURE — 99232 SBSQ HOSP IP/OBS MODERATE 35: CPT | Performed by: INTERNAL MEDICINE

## 2017-02-23 PROCEDURE — 25010000002 ENOXAPARIN PER 10 MG: Performed by: INTERNAL MEDICINE

## 2017-02-23 PROCEDURE — 25010000002 METHYLPREDNISOLONE PER 40 MG: Performed by: INTERNAL MEDICINE

## 2017-02-23 PROCEDURE — 94660 CPAP INITIATION&MGMT: CPT

## 2017-02-23 PROCEDURE — 80053 COMPREHEN METABOLIC PANEL: CPT | Performed by: HOSPITALIST

## 2017-02-23 RX ADMIN — ESCITALOPRAM OXALATE 20 MG: 20 TABLET ORAL at 08:48

## 2017-02-23 RX ADMIN — GUAIFENESIN 600 MG: 600 TABLET, EXTENDED RELEASE ORAL at 08:49

## 2017-02-23 RX ADMIN — DILTIAZEM HYDROCHLORIDE 90 MG: 30 TABLET, FILM COATED ORAL at 17:27

## 2017-02-23 RX ADMIN — DILTIAZEM HYDROCHLORIDE 90 MG: 30 TABLET, FILM COATED ORAL at 11:41

## 2017-02-23 RX ADMIN — ATENOLOL 100 MG: 50 TABLET ORAL at 17:26

## 2017-02-23 RX ADMIN — IPRATROPIUM BROMIDE AND ALBUTEROL SULFATE 3 ML: .5; 3 SOLUTION RESPIRATORY (INHALATION) at 00:41

## 2017-02-23 RX ADMIN — PANTOPRAZOLE SODIUM 40 MG: 40 TABLET, DELAYED RELEASE ORAL at 05:36

## 2017-02-23 RX ADMIN — GUAIFENESIN 600 MG: 600 TABLET, EXTENDED RELEASE ORAL at 17:27

## 2017-02-23 RX ADMIN — LORAZEPAM 1 MG: 0.5 TABLET ORAL at 21:37

## 2017-02-23 RX ADMIN — IPRATROPIUM BROMIDE AND ALBUTEROL SULFATE 3 ML: .5; 3 SOLUTION RESPIRATORY (INHALATION) at 18:28

## 2017-02-23 RX ADMIN — CEFTRIAXONE 1 G: 1 INJECTION, POWDER, FOR SOLUTION INTRAMUSCULAR; INTRAVENOUS at 11:39

## 2017-02-23 RX ADMIN — FLUTICASONE PROPIONATE 2 SPRAY: 50 SPRAY, METERED NASAL at 08:49

## 2017-02-23 RX ADMIN — METHYLPREDNISOLONE SODIUM SUCCINATE 40 MG: 40 INJECTION, POWDER, FOR SOLUTION INTRAMUSCULAR; INTRAVENOUS at 11:40

## 2017-02-23 RX ADMIN — LORAZEPAM 1 MG: 0.5 TABLET ORAL at 08:48

## 2017-02-23 RX ADMIN — ATENOLOL 100 MG: 50 TABLET ORAL at 08:48

## 2017-02-23 RX ADMIN — LORAZEPAM 1 MG: 0.5 TABLET ORAL at 17:27

## 2017-02-23 RX ADMIN — IPRATROPIUM BROMIDE AND ALBUTEROL SULFATE 3 ML: .5; 3 SOLUTION RESPIRATORY (INHALATION) at 12:24

## 2017-02-23 RX ADMIN — DOCUSATE SODIUM 100 MG: 100 CAPSULE, LIQUID FILLED ORAL at 17:27

## 2017-02-23 RX ADMIN — BUDESONIDE 0.5 MG: 0.5 INHALANT RESPIRATORY (INHALATION) at 07:00

## 2017-02-23 RX ADMIN — LISINOPRIL 10 MG: 10 TABLET ORAL at 11:40

## 2017-02-23 RX ADMIN — PRAVASTATIN SODIUM 40 MG: 20 TABLET ORAL at 21:36

## 2017-02-23 RX ADMIN — IPRATROPIUM BROMIDE AND ALBUTEROL SULFATE 3 ML: .5; 3 SOLUTION RESPIRATORY (INHALATION) at 15:51

## 2017-02-23 RX ADMIN — ASPIRIN 81 MG 81 MG: 81 TABLET ORAL at 08:49

## 2017-02-23 RX ADMIN — ENOXAPARIN SODIUM 40 MG: 40 INJECTION SUBCUTANEOUS at 11:39

## 2017-02-23 RX ADMIN — BUDESONIDE 0.5 MG: 0.5 INHALANT RESPIRATORY (INHALATION) at 18:28

## 2017-02-23 RX ADMIN — DILTIAZEM HYDROCHLORIDE 90 MG: 30 TABLET, FILM COATED ORAL at 05:36

## 2017-02-23 RX ADMIN — IPRATROPIUM BROMIDE AND ALBUTEROL SULFATE 3 ML: .5; 3 SOLUTION RESPIRATORY (INHALATION) at 21:55

## 2017-02-23 RX ADMIN — NICOTINE 1 PATCH: 14 PATCH TRANSDERMAL at 11:42

## 2017-02-23 RX ADMIN — IPRATROPIUM BROMIDE AND ALBUTEROL SULFATE 3 ML: .5; 3 SOLUTION RESPIRATORY (INHALATION) at 07:00

## 2017-02-23 RX ADMIN — DOCUSATE SODIUM 100 MG: 100 CAPSULE, LIQUID FILLED ORAL at 08:48

## 2017-02-23 NOTE — PROGRESS NOTES
Continued Stay Note  [facility]     Patient Name: Elizabeth Fulton  MRN: [id number]  Today's Date: 2/23/2017    Admit Date: 2/17/2017          Discharge Plan       02/23/17 1105    Case Management/Social Work Plan    Plan Per Dr Spann will need [facility] unit at discharge.  Called to Dr Beltre for setting and wanted [facility] to fax DWO to his office at  and he will fill it out with settings.  Called to Nadine at [facility].  Pt is a current o2 p at [facility] and is being served out of the [employer] office. Referral and orders faxed to Nadine who will forward to [employer] office.  Will send necessary information to Dr Beltre.  Nadine informed pt is to go home tomorrow 2/24/17.  Received call from Daughter Lona Golden informed Cm working on getting [facility] unit.  Daughter states she is working today can call and leave a message on her cell phone at  or call her  Brayden at  if need anything.   Cm will continue to follow               Discharge Codes     None        Expected Discharge Date and Time     Expected Discharge Date Expected Discharge Time    Feb 24, 2017             Yesy Nielsen

## 2017-02-23 NOTE — PROGRESS NOTES
Joel Goetz MD  CARDIOLOGY FOLLOW UP NOTE    PATIENT: Elizabeth Fulton                                                   DATE: 17   306/1  : 1950     PRIMARY PHYSICIAN: Young Nugent MD    Reason for consult: History of atrial fibrillation with rapid ventricular response, acute and chronic hypoxemic respiratory failure, borderline cardiac markers and accelerated hypertension    Subjective: Patient has had substantial improvement in her shortness of hair after resumption of CPAP.  Apparently overnight and she has been compliant with positive pressure breathing.  She has cough though without significant sputum expectoration.  Patient has not ambulated any significant distances.  Patient has minimal orthopnea although she denies PND's and has not had significant dependent edema..    Since transfer to the floor patient has maintained normal sinus rhythm without recurrence of breakthrough atrial fibrillation despite recurrent episodes of acute shortness of air.  Patient is currently on high-dose beta blocker therapy and calcium antagonist therapy.      Patient, who was noticed to have significant elevation of blood pressure, has had improved blood pressure control since yesterday.      Patient, who has potential CAD, denies any chest pain, pressure or tightness as noted previously patient has been more or less bed ridden.      Her abdominal complaints have improved as well.      PAST MEDICAL HISTORY:     1. Atherosclerotic cardiovascular disease.  a. Peripheral arterial disease. Bilateral femoral bruits, especially right, with no definitive intermittent claudication.  b. Moderately high likelihood of underlying coronary artery disease based on risk profile and evidence of peripheral arterial disease with no prior scintigraphic or angiographic studies. On echocardiographic studies performed today, patient did not have any wall motion abnormalities.  c. Potential renovascular  "disease.  d. Potential mesenteric disease.  2. History of longstanding hypertension with hypertensive heart disease. Patient apparently has been hypertensive for several decades, but is not known to have definitive underlying renal parenchymal and renovascular disease. She has been on ACE inhibitor therapy and diuretic therapy.  3. Longstanding dyslipidemia.  4. History of advanced COPD with known respiratory failure, with suggestion of borderline RV function. Her RV function however was normal and right-sided filling pressures were normal as well.  Patient has previously undergone CT scan of the P protocol which was negative.  5. History of mild aortic sclerosis and mild mitral insufficiency.  6. Paroxysmal atrial dysrhythmia with no definitive known sustained dysrhythmias. Patient with anemia, has not been on antithrombotic therapy in the past.  7. Potential GI hemorrhage.  8. History of anxiety state and depression and history of prior colon malignancies status post chemotherapy with radiation.    PAST SURGICAL HISTORY:   1. Appendectomy.  2. Cholecystectomy.    SOCIAL HISTORY:  Patient had worked as a . She is retired and she is a . She has moved from [place] to live with her daughter in [place]. Patient continues to smoke possibly half pack to a pack per day.    FAMILY HISTORY: Not contributory    PHYSICAL EXAMINATION:  GENERAL: Pale-looking elderly female  Visit Vitals   • BP (!) 124/6   • Pulse 66   • Temp 97.9 °F (36.6 °C) (Oral)   • Resp 20   • Ht 63\" (160 cm)   • Wt 105 lb 3 oz (47.7 kg)   • SpO2 94%   • BMI 18.63 kg/m2    Body mass index is 18.63 kg/(m^2). HEENT:  Head: Atraumatic, normocephalic, No tenderness over paranasal sinuses, external nares normal. No oral or nasal mucosal lesion. Sclera non-icteric ocular movements are normal with pupils reacting both to light and accommodations. Ocular fundi not seen. NECK: Carotid upstroke is normal, there are no carotid bruits, no JVD, no " thyromegaly, no cervical or axillary lymphadenopathy. HEART: Harbinger beat is not displaced, no thrill is appreciated and both heart sounds are normal.  There is no murmur or rub audible. BRONCHOPULMONARY: Patient has markedly diminished air entry with bronchovesicular sounds. No adventious sounds audible. GI & : Soft, no tenderness elicited and no viscera are palpable. Bowel sounds are positive and there is no renal bruits audible. EXTREMITIES:  Distal vessels could not be palpated there are no femoral bruits audible. Patient does not have dependent edema. DERM: No skin rash. CNS: No gross motor neurological deficit. MUSCULOSKELETAL: No joint swelling notice and patient has normal range of motion in lower extremity.       Intake/Output Summary (Last 24 hours) at 02/23/17 0953  Last data filed at 02/23/17 0838   Gross per 24 hour   Intake    530 ml   Output    375 ml   Net    155 ml     Wt Readings from Last 7 Encounters:   02/23/17 105 lb 3 oz (47.7 kg)     LABS:     Results from last 7 days  Lab Units 02/21/17  0446 02/19/17  0430 02/18/17  0508   WBC 10*3/mm3 8.40 7.04 8.20   HEMOGLOBIN g/dL 10.4* 9.3* 9.2*   HEMATOCRIT % 33.2* 30.0* 28.8*   PLATELETS 10*3/mm3 293 260 267                     Results from last 7 days  Lab Units 02/18/17  0508 02/17/17  1312   CK TOTAL U/L  --  32   TROPONIN I ng/mL 0.018 0.046*     No results found for: HGBA1C    Results from last 7 days  Lab Units 02/19/17  0431   TSH mIU/mL <0.015*   FREE T4 ng/dL 1.61           RADIOLOGY:   Imaging Results (last 24 hours)     ** No results found for the last 24 hours. **          No Known Allergies    aspirin 81 mg Oral Daily   atenolol 100 mg Oral BID   budesonide 0.5 mg Nebulization BID - RT   ceftriaxone 1 g Intravenous Q24H   diltiaZEM 90 mg Oral Q6H   docusate sodium 100 mg Oral BID   enoxaparin 40 mg Subcutaneous Q12H   escitalopram 20 mg Oral Daily   fluticasone 2 spray Nasal Daily   guaiFENesin 600 mg Oral BID   ipratropium-albuterol 3 mL  Nebulization Q6H - RT   lisinopril 10 mg Oral Daily   LORazepam 1 mg Oral BID   methylPREDNISolone sodium succinate 40 mg Intravenous Q12H   nicotine 1 patch Transdermal Q24H   pantoprazole 40 mg Oral Q AM   pravastatin 40 mg Oral Nightly   sodium chloride 1,000 mL Intravenous Once     ASSESSMENT /PLAN:    1. Patient who has multiple substrates for, has had paroxysmal atrial fibrillation with rapid ventricular response prompting multiple AV blockers and as noted earlier, in general, patient has maintained normal sinus rhythm without any antiarrhythmic therapy.  Patient was felt not to be candidate for antithrombotic therapy on account of melena.  Patient does have have several substrates for atrial fibrillation with history of occasional palpitation in the past.  Although there is no historic data suggesting sustained dysrhythmias prior to current hospitalization , it is very likely that the patient would develop recurrent dysrhythmia on that this stage she would not merit antiarrhythmic therapy as noted earlier.  Patient is currently on AV blocker therapy including high-dose beta blocker and calcium antagonist therapy    2. Patient, who has high likelihood of underlying coronary artery disease, has had history of  borderline cardiac markers. In all likelihood, given suggestion of mild sepsis syndrome, it may reflect acute systemic inflammatory response versus RV strain as she does have prior history of borderline RV function. Understandably, on account of risk profile, especially given evidence of peripheral arterial disease, patient has moderate if not higher potential for underlying coronary artery disease.  However it was felt that in the absence of a new symptomatology, she would not necessarily merit invasive workup at this stage but as an outpatient, perfusion study would still be relevant.  Patient has substantial potential for cardiovascular morbidity and mortality.  Patient would not necessarily merit long  acting nitrates which has been discontinued.    3. Patient has had elevation of blood pressure in the recent past which was thought to be driven by steroid therapy but now it appears to have fairly stable  blood pressure.  She is currently on ACE inhibitor therapy, beta blocker therapy and calcium antagonist therapy.  At some juncture she may merit workup for renovascular disease.  Her blood pressure control has improved substantially.   4. Risk reduction.  Patient is well aware of need for absolute smoking cessation .  At this stage patient does appear to have decided not to smoke again.  Apparently patient has been on Chantix in the past.  5. Other issues noted include history of rather advanced chronic obstructive pulmonary disease with frequent exacerbation with acute on chronic hypoxemic respiratory failure with altered mental status secondary to hypercapnia for which patient under care of pulmonary services.  Based on recent ABGs her hypercapnia has improved substantially.  Patient was documented to have very hyperdynamic LV function with pulmonary hypertension though filling pressures appeared appropriate.  I doubt any cardiac contribution to her poor respiratory status.  Patient may be eventually considered candidate for pulmonary rehabilitation.  6.  Additional diagnoses include chronic anxiety state for which she may merit more aggressive sedative regimen.                     In closing, I sincerely appreciate opportunity to participate in care of this patient. If I can be of any further assistance with the management of this patient, please don’t hesitate to contact me.    DARRELL OROZCO M.D. [facility]

## 2017-02-23 NOTE — PLAN OF CARE
Problem: Patient Care Overview (Adult)  Goal: Plan of Care Review  Outcome: Ongoing (interventions implemented as appropriate)    02/23/17 1531   Coping/Psychosocial Response Interventions   Plan Of Care Reviewed With patient   Patient Care Overview   Progress improving

## 2017-02-23 NOTE — PROGRESS NOTES
Continued Stay Note   Da     Patient Name: Elizabeth Fulton  MRN: [id number]  Today's Date: 2/23/2017    Admit Date: 2/17/2017          Discharge Plan       02/23/17 1549    Case Management/Social Work Plan    Plan Received call from Kayleigh finley [facility] has faxed everythiing to Dr Beltre for signatures.  Sees no problem with getting Trilogy but will call back to  if has any problems.                Discharge Codes     None        Expected Discharge Date and Time     Expected Discharge Date Expected Discharge Time    Feb 24, 2017             Yesy Nielsen

## 2017-02-23 NOTE — PLAN OF CARE
Problem: NPPV/CPAP (Adult)  Goal: Signs and Symptoms of Listed Potential Problems Will be Absent or Manageable (NPPV/CPAP)  Outcome: Ongoing (interventions implemented as appropriate)    02/23/17 1530   NPPV/CPAP   Problems Assessed (NPPV/CPAP) hypoxia/hypoxemia;situational response   Problems Present (NPPV/CPAP) none

## 2017-02-23 NOTE — PLAN OF CARE
Problem: NPPV/CPAP (Adult)  Intervention: Monitor/Manage Anxiety Related to NPPV/CPAP    02/23/17 0310   Coping Strategies   Trust Relationship/Rapport care explained         Goal: Signs and Symptoms of Listed Potential Problems Will be Absent or Manageable (NPPV/CPAP)  Outcome: Ongoing (interventions implemented as appropriate)    02/23/17 0310   NPPV/CPAP   Problems Assessed (NPPV/CPAP) hypoxia/hypoxemia;skin breakdown   Problems Present (NPPV/CPAP) none

## 2017-02-23 NOTE — PLAN OF CARE
Problem: Patient Care Overview (Adult)  Goal: Plan of Care Review  Outcome: Ongoing (interventions implemented as appropriate)  Goal: Adult Individualization and Mutuality  Outcome: Ongoing (interventions implemented as appropriate)    02/22/17 1509 02/23/17 0332   Individualization   Patient Specific Preferences Likes sprite --    Patient Specific Interventions --  Wear bipap at night   Mutuality/Individual Preferences   What Anxieties, Fears or Concerns Do You Have About Your Health or Care? anxious when SOA --        Goal: Discharge Needs Assessment  Outcome: Ongoing (interventions implemented as appropriate)    Problem: Pneumonia (Adult)  Goal: Signs and Symptoms of Listed Potential Problems Will be Absent or Manageable (Pneumonia)  Outcome: Ongoing (interventions implemented as appropriate)    02/22/17 1509 02/23/17 0332   Pneumonia   Problems Assessed (Pneumonia) progression of infection;respiratory compromise --    Problems Present (Pneumonia) --  none         Problem: NPPV/CPAP (Adult)  Goal: Signs and Symptoms of Listed Potential Problems Will be Absent or Manageable (NPPV/CPAP)  Outcome: Ongoing (interventions implemented as appropriate)    02/23/17 0310   NPPV/CPAP   Problems Assessed (NPPV/CPAP) hypoxia/hypoxemia;skin breakdown   Problems Present (NPPV/CPAP) none         Problem: Pressure Ulcer Risk (Glenn Scale) (Adult,Obstetrics,Pediatric)  Goal: Identify Related Risk Factors and Signs and Symptoms  Outcome: Outcome(s) achieved Date Met:  02/23/17 02/23/17 0332   Pressure Ulcer Risk (Glenn Scale)   Related Risk Factors (Pressure Ulcer Risk (Glenn Scale)) age extremes;body weight extremes;critical care admission;hospitalization prolonged;medical devices;mobility impaired       Goal: Skin Integrity  Outcome: Ongoing (interventions implemented as appropriate)    02/23/17 0332   Pressure Ulcer Risk (Glenn Scale) (Adult,Obstetrics,Pediatric)   Skin Integrity making progress toward outcome

## 2017-02-23 NOTE — PROGRESS NOTES
"S: Is currently OFF BiPAP. Says that she feels a lot better.    O:Vital signs reviewed.  Visit Vitals   • BP (!) 124/6   • Pulse 66   • Temp 97.9 °F (36.6 °C) (Oral)   • Resp 20   • Ht 63\" (160 cm)   • Wt 105 lb 3 oz (47.7 kg)   • SpO2 94%   • BMI 18.63 kg/m2       General: No acute distress noted.  Cardiovascular: S1 + S2. Occasionally irregular.   Respiratory: Mild respiratory distress noted. No significant wheezing heard. No crackles noted  Extremities: No edema noted.  Neurologic: AAOx3. Was able to follow commands.   Skin: Appeared somewhat dry       Assessment & Recommendations/Plan:   1. Acute hypercarbic respiratory failure  2. Unlikely Pneumonia  3. Severe COPD.  4. Continued smoking.  5. Atrial fibrillation with RVR. Resolved.  6. Chronic Resp Failure.      Will change to PO Steroids.      Can be discharged home once Non Invasive Ventilation unit has been arranged.         Yvon Fam MD  02/23/17  9:46 AM    The patient will need to use the noninvasive ventilator for nocturnal and daytime use. Due to severity of the condition, BiPAP alone would be insufficient.  Severe COPD seems to be the primary cause of chronic respiratory failure in this patient requiring noninvasive ventilator. There maybe serious detriment to the patient's health, including the possibility of recurrent exacerbations, worsening symptoms/respiratory status etc., if noninvasive ventilator is not used.      Yvon Fam MD  02/23/17  9:50 AM      "

## 2017-02-23 NOTE — PROGRESS NOTES
INPATIENT PROGRESS NOTE    Date of Admission: 2/17/2017  Length of Stay: 5  Primary Care Physician: Young Nugent DO    Subjective   HPI:    Interval History:  02/22/17 - Seen and examined.  Patient is continuing slow improvement per her estimation.  She still seemed to have increased WOB to me.  Uses the biPAP frequently to rest, but now for shorter durations.  Appetite is finally improved.  She is drinking 2-3 Boosts per day in addition to eating half her meals.  Denies CP, dysuria, GI complaints, fever, chills, confusion.  Cough remains productive of small amounts of yellow and green sputum.    Review Of Systems:  Otherwise complete ROS is negative except as mentioned above    Objective      Temp:  [97.6 °F (36.4 °C)-97.8 °F (36.6 °C)] 97.8 °F (36.6 °C)  Heart Rate:  [70-77] 77  Resp:  [20-21] 20  BP: (154-169)/(87-89) 169/89    Gen: Alert, appropriate, pleasant and interactive  HEENT: EOMI, ATNC, MMM  Neck: Supple  Heart: S1S2, RRR  Lungs: very few scattered wheezes, R>L  Abdomen: Soft, NTND, BS+  Extremities: Warm, well-perfused, + pulses  Skin: P/W/D  Neuro: A/O x3, speech clear    Results Review:    I have reviewed the labs, radiology results and diagnostic studies.    Lab Results (last 24 hours)     Procedure Component Value Units Date/Time    POC Glucose Fingerstick [[id number]]  (Abnormal) Collected:  02/21/17 2030    Specimen:  Blood Updated:  02/21/17 2205     Glucose 146 (H) mg/dL       Serial Number: [id number]    : [id number]       POC Glucose Fingerstick [[id number]]  (Abnormal) Collected:  02/22/17 0705    Specimen:  Blood Updated:  02/22/17 0711     Glucose 213 (H) mg/dL       Serial Number: [id number]    : [id number]       Blood Gas, Arterial [[id number]]  (Abnormal) Collected:  02/22/17 0809    Specimen:  Arterial Blood Updated:  02/22/17 0809     Site Arterial: right brachial      Bassma's Test N/A      pH, Arterial 7.426 pH units      pCO2, Arterial 55.7 (H) mm Hg      pO2, Arterial  30.5 (L) mm Hg      HCO3, Arterial 36.7 (H) mmol/L      Base Excess, Arterial 12.3 mmol/L      O2 Saturation, Arterial 60.2 %      Hemoglobin, Blood Gas 10.8 (L) g/dL      Barometric Pressure for Blood Gas 732 mmHg      Modality Cannula - Nasal      FIO2 36 %     Blood Gas, Arterial [[id number]]  (Abnormal) Collected:  02/22/17 0809    Specimen:  Arterial Blood Updated:  02/22/17 0810     Site Arterial: right brachial      Bassam's Test N/A      pH, Arterial 7.426 pH units      pCO2, Arterial 55.7 (H) mm Hg      pO2, Arterial 30.5 (L) mm Hg      HCO3, Arterial 36.7 (H) mmol/L      Base Excess, Arterial 12.3 mmol/L      O2 Saturation, Arterial 60.2 %      Hemoglobin, Blood Gas 10.8 (L) g/dL      Barometric Pressure for Blood Gas 732 mmHg      Modality Cannula - Nasal      FIO2 36 %     POC Glucose Fingerstick [[id number]]  (Abnormal) Collected:  02/22/17 1108    Specimen:  Blood Updated:  02/22/17 1147     Glucose 149 (H) mg/dL       Serial Number: [id number]    : [id number]       Blood Culture [[id number]]  (Normal) Collected:  02/17/17 1311    Specimen:  Blood from Hand, Left Updated:  02/22/17 1401     Blood Culture No growth at 5 days     Blood Culture [[id number]]  (Normal) Collected:  02/17/17 1312    Specimen:  Blood from Hand, Left Updated:  02/22/17 1401     Blood Culture No growth at 5 days     POC Glucose Fingerstick [[id number]]  (Abnormal) Collected:  02/22/17 1648    Specimen:  Blood Updated:  02/22/17 1702     Glucose 152 (H) mg/dL       Serial Number: [id number]    : [id number]       POC Glucose Fingerstick [[id number]]  (Abnormal) Collected:  02/22/17 2007    Specimen:  Blood Updated:  02/22/17 2155     Glucose 205 (H) mg/dL       Serial Number: [id number]    : [id number]             BLOOD CULTURE   Date Value Ref Range Status   02/17/2017 No growth at 5 days  Final        MRSA SCREEN CX   Date Value Ref Range Status   02/17/2017   Final    No Methicillin Resistant Staphylococcus aureus isolated                I have reviewed the medications.      Current Facility-Administered Medications:   •  acetaminophen (TYLENOL) tablet 650 mg, 650 mg, Oral, Q4H PRN, Shakeel Machado MD  •  ammonium lactate (LAC-HYDRIN) 12 % lotion, , Topical, BID PRN, Shakeel Machado MD  •  aspirin chewable tablet 81 mg, 81 mg, Oral, Daily, Shakeel Machado MD, 81 mg at 02/22/17 0946  •  atenolol (TENORMIN) tablet 100 mg, 100 mg, Oral, BID, Joel Goetz MD, 100 mg at 02/22/17 1755  •  budesonide (PULMICORT) nebulizer solution 0.5 mg, 0.5 mg, Nebulization, BID - RT, Shakeel Machado MD, 0.5 mg at 02/22/17 1913  •  cefTRIAXone (ROCEPHIN) 1 g/50 mL 0.9% NS VTB (mbp), 1 g, Intravenous, Q24H, Shakeel Machado MD, 1 g at 02/22/17 1239  •  CHAPSTICK 1 each, 1 each, Apply externally, PRN, Shakeel Machado MD, 1 each at 02/20/17 2032  •  diltiaZEM (CARDIZEM) tablet 90 mg, 90 mg, Oral, Q6H, Joel Goetz MD, 90 mg at 02/22/17 1755  •  docusate sodium (COLACE) capsule 100 mg, 100 mg, Oral, BID, Shakeel Machado MD, 100 mg at 02/22/17 1755  •  enoxaparin (LOVENOX) syringe 40 mg, 40 mg, Subcutaneous, Q12H, Shakeel Machado MD, 40 mg at 02/22/17 1233  •  escitalopram (LEXAPRO) tablet 20 mg, 20 mg, Oral, Daily, Shakeel Machado MD, 20 mg at 02/22/17 0944  •  fluticasone (FLONASE) 50 MCG/ACT nasal spray 2 spray, 2 spray, Nasal, Daily, Shakeel Machado MD, 2 spray at 02/22/17 0947  •  guaiFENesin (MUCINEX) 12 hr tablet 600 mg, 600 mg, Oral, BID, Shakeel Machado MD, 600 mg at 02/22/17 1755  •  hydrALAZINE (APRESOLINE) injection 10 mg, 10 mg, Intravenous, Q6H PRN, Nadine Kapoor DO, 10 mg at 02/20/17 0846  •  ipratropium-albuterol (DUO-NEB) nebulizer solution 3 mL, 3 mL, Nebulization, Q4H PRN, Shakeel Machado MD, 3 mL at 02/22/17 0447  •  ipratropium-albuterol (DUO-NEB) nebulizer solution 3 mL, 3 mL, Nebulization, Q6H - RT, Shakeel Machado MD, 3 mL at 02/22/17 1913  •  lisinopril (PRINIVIL,ZESTRIL) tablet 10 mg, 10 mg, Oral, Daily, Yvon Fam MD, 10 mg at 02/22/17 1234  •  LORazepam (ATIVAN)  tablet 1 mg, 1 mg, Oral, BID, Shakeel Machado MD, 1 mg at 02/22/17 1755  •  LORazepam (ATIVAN) tablet 1 mg, 1 mg, Oral, Nightly PRN, Nadine Kapoor DO, 1 mg at 02/22/17 2031  •  melatonin tablet 6 mg, 6 mg, Oral, Nightly PRN, Sal Spann MD  •  methylPREDNISolone sodium succinate (SOLU-Medrol) injection 40 mg, 40 mg, Intravenous, Q12H, Shakeel Machado MD, 40 mg at 02/22/17 1234  •  nicotine (NICODERM CQ) 14 MG/24HR patch 1 patch, 1 patch, Transdermal, Q24H, Shakeel Machado MD, 1 patch at 02/22/17 1234  •  ondansetron (ZOFRAN) tablet 4 mg, 4 mg, Oral, Q6H PRN **OR** ondansetron ODT (ZOFRAN-ODT) disintegrating tablet 4 mg, 4 mg, Oral, Q6H PRN **OR** ondansetron (ZOFRAN) injection 4 mg, 4 mg, Intravenous, Q6H PRN, Shakeel Machado MD  •  pantoprazole (PROTONIX) EC tablet 40 mg, 40 mg, Oral, Q AM, Shakeel Machado MD, 40 mg at 02/22/17 0623  •  pravastatin (PRAVACHOL) tablet 40 mg, 40 mg, Oral, Nightly, Shakeel Machado MD, 40 mg at 02/22/17 2024  •  sodium chloride (OCEAN) nasal spray 1 spray, 1 spray, Each Nare, PRN, Nadine Kapoor DO, 1 spray at 02/17/17 2106  •  sodium chloride 0.9 % bolus 1,000 mL, 1,000 mL, Intravenous, Once, Shakeel Machado MD  •  sodium chloride 0.9 % flush 1-10 mL, 1-10 mL, Intravenous, PRN, Shakeel Machado MD, 10 mL at 02/20/17 2220    Assessment/Plan     Assessment/Problem List  Active Problems:    Acute exacerbation of chronic obstructive pulmonary disease (COPD)    Acute hypercapnic respiratory failure    Acute on chronic respiratory failure with hypoxia    Pneumonia of right lower lobe due to infectious organism    Cigarette nicotine dependence with nicotine-induced disorder    Plan     1. Acute hypercapnic respiratory failure, present on admission.  2. Acute on chronic hypoxic respiratory failure, present on admission.  3. Acute COPD exacerbation, present on admission.  4. Right lower lobe bacterial pneumonia, community-acquired, present on admission.  5. Acute atrial fibrillation with rapid  ventricular rate.  6. Essential hypertension.  7. Coronary artery disease.  8. Nicotine dependence.  9. Hepatitis, uncertain etiology, possibly related to statin use.  10. Moderate malnutrition with BMI of 17.     Plan:      Acute Hypercapnic/acute on chronic hypoxic respiratory failure  AE COPD  RLL PNA  - Continue supplemental oxygen, bronchodilator nebulization, budesonide, IV Solu-Medrol as well as mucolytic agents.   - Azith/ceftriaxone/vanc     Atrial Fibrillation  - ASA 81mg  - Cardizem/atenolol  - cardiology following     HTN  - atenolol/cardizem/lisinopril     Hepatitis      Continue treatment as above  AM labs               Sal Spann MD 02/22/17 10:01 PM

## 2017-02-24 LAB
ARTERIAL PATENCY WRIST A: POSITIVE
ARTERIAL PATENCY WRIST A: POSITIVE
ATMOSPHERIC PRESS: 728 MMHG
ATMOSPHERIC PRESS: 728 MMHG
BASE EXCESS BLDA CALC-SCNC: 14.8 MMOL/L
BASE EXCESS BLDA CALC-SCNC: 14.8 MMOL/L
BDY SITE: ABNORMAL
BDY SITE: ABNORMAL
HCO3 BLDA-SCNC: 39.3 MMOL/L (ref 22–28)
HCO3 BLDA-SCNC: 39.3 MMOL/L (ref 22–28)
HGB BLDA-MCNC: 11.3 G/DL (ref 12–18)
HGB BLDA-MCNC: 11.3 G/DL (ref 12–18)
HOROWITZ INDEX BLD+IHG-RTO: 40 %
HOROWITZ INDEX BLD+IHG-RTO: 40 %
MODALITY: ABNORMAL
MODALITY: ABNORMAL
PCO2 BLDA: 60.7 MM HG (ref 35–45)
PCO2 BLDA: 60.7 MM HG (ref 35–45)
PH BLDA: 7.42 PH UNITS
PH BLDA: 7.42 PH UNITS
PO2 BLDA: 36.8 MM HG (ref 75–100)
PO2 BLDA: 36.8 MM HG (ref 75–100)
SAO2 % BLDCOA: 70.9 %
SAO2 % BLDCOA: 70.9 %

## 2017-02-24 PROCEDURE — 99232 SBSQ HOSP IP/OBS MODERATE 35: CPT | Performed by: NURSE PRACTITIONER

## 2017-02-24 PROCEDURE — 94660 CPAP INITIATION&MGMT: CPT

## 2017-02-24 PROCEDURE — 94799 UNLISTED PULMONARY SVC/PX: CPT

## 2017-02-24 PROCEDURE — 25010000002 ENOXAPARIN PER 10 MG: Performed by: INTERNAL MEDICINE

## 2017-02-24 PROCEDURE — 99232 SBSQ HOSP IP/OBS MODERATE 35: CPT | Performed by: HOSPITALIST

## 2017-02-24 PROCEDURE — 82805 BLOOD GASES W/O2 SATURATION: CPT

## 2017-02-24 PROCEDURE — 25010000002 METHYLPREDNISOLONE PER 40 MG: Performed by: INTERNAL MEDICINE

## 2017-02-24 PROCEDURE — 36600 WITHDRAWAL OF ARTERIAL BLOOD: CPT

## 2017-02-24 RX ORDER — DILTIAZEM HYDROCHLORIDE 180 MG/1
180 CAPSULE, COATED, EXTENDED RELEASE ORAL
Status: DISCONTINUED | OUTPATIENT
Start: 2017-02-24 | End: 2017-02-25

## 2017-02-24 RX ADMIN — ENOXAPARIN SODIUM 40 MG: 40 INJECTION SUBCUTANEOUS at 00:50

## 2017-02-24 RX ADMIN — DOCUSATE SODIUM 100 MG: 100 CAPSULE, LIQUID FILLED ORAL at 17:19

## 2017-02-24 RX ADMIN — ASPIRIN 81 MG 81 MG: 81 TABLET ORAL at 09:30

## 2017-02-24 RX ADMIN — LORAZEPAM 1 MG: 0.5 TABLET ORAL at 20:49

## 2017-02-24 RX ADMIN — DOCUSATE SODIUM 100 MG: 100 CAPSULE, LIQUID FILLED ORAL at 09:30

## 2017-02-24 RX ADMIN — DILTIAZEM HYDROCHLORIDE 180 MG: 180 CAPSULE, COATED, EXTENDED RELEASE ORAL at 11:53

## 2017-02-24 RX ADMIN — LORAZEPAM 1 MG: 0.5 TABLET ORAL at 09:30

## 2017-02-24 RX ADMIN — GUAIFENESIN 600 MG: 600 TABLET, EXTENDED RELEASE ORAL at 17:19

## 2017-02-24 RX ADMIN — IPRATROPIUM BROMIDE AND ALBUTEROL SULFATE 3 ML: .5; 3 SOLUTION RESPIRATORY (INHALATION) at 20:09

## 2017-02-24 RX ADMIN — BUDESONIDE 0.5 MG: 0.5 INHALANT RESPIRATORY (INHALATION) at 20:09

## 2017-02-24 RX ADMIN — PRAVASTATIN SODIUM 40 MG: 20 TABLET ORAL at 20:49

## 2017-02-24 RX ADMIN — ENOXAPARIN SODIUM 40 MG: 40 INJECTION SUBCUTANEOUS at 11:54

## 2017-02-24 RX ADMIN — DILTIAZEM HYDROCHLORIDE 90 MG: 30 TABLET, FILM COATED ORAL at 00:50

## 2017-02-24 RX ADMIN — PANTOPRAZOLE SODIUM 40 MG: 40 TABLET, DELAYED RELEASE ORAL at 05:40

## 2017-02-24 RX ADMIN — DILTIAZEM HYDROCHLORIDE 90 MG: 30 TABLET, FILM COATED ORAL at 05:39

## 2017-02-24 RX ADMIN — FLUTICASONE PROPIONATE 2 SPRAY: 50 SPRAY, METERED NASAL at 10:43

## 2017-02-24 RX ADMIN — NICOTINE 1 PATCH: 14 PATCH TRANSDERMAL at 11:56

## 2017-02-24 RX ADMIN — METHYLPREDNISOLONE SODIUM SUCCINATE 40 MG: 40 INJECTION, POWDER, FOR SOLUTION INTRAMUSCULAR; INTRAVENOUS at 00:50

## 2017-02-24 RX ADMIN — IPRATROPIUM BROMIDE AND ALBUTEROL SULFATE 3 ML: .5; 3 SOLUTION RESPIRATORY (INHALATION) at 13:27

## 2017-02-24 RX ADMIN — IPRATROPIUM BROMIDE AND ALBUTEROL SULFATE 3 ML: .5; 3 SOLUTION RESPIRATORY (INHALATION) at 07:32

## 2017-02-24 RX ADMIN — METHYLPREDNISOLONE SODIUM SUCCINATE 40 MG: 40 INJECTION, POWDER, FOR SOLUTION INTRAMUSCULAR; INTRAVENOUS at 11:52

## 2017-02-24 RX ADMIN — ATENOLOL 100 MG: 50 TABLET ORAL at 09:31

## 2017-02-24 RX ADMIN — LORAZEPAM 1 MG: 0.5 TABLET ORAL at 17:18

## 2017-02-24 RX ADMIN — BUDESONIDE 0.5 MG: 0.5 INHALANT RESPIRATORY (INHALATION) at 07:32

## 2017-02-24 RX ADMIN — GUAIFENESIN 600 MG: 600 TABLET, EXTENDED RELEASE ORAL at 09:32

## 2017-02-24 RX ADMIN — IPRATROPIUM BROMIDE AND ALBUTEROL SULFATE 3 ML: .5; 3 SOLUTION RESPIRATORY (INHALATION) at 04:31

## 2017-02-24 RX ADMIN — IPRATROPIUM BROMIDE AND ALBUTEROL SULFATE 3 ML: .5; 3 SOLUTION RESPIRATORY (INHALATION) at 00:54

## 2017-02-24 RX ADMIN — ESCITALOPRAM OXALATE 20 MG: 20 TABLET ORAL at 09:34

## 2017-02-24 RX ADMIN — LISINOPRIL 10 MG: 10 TABLET ORAL at 11:53

## 2017-02-24 RX ADMIN — ATENOLOL 100 MG: 50 TABLET ORAL at 17:19

## 2017-02-24 NOTE — PROGRESS NOTES
"S: Is currently ON BiPAP. She says she is breathing better.    O:Vital signs reviewed.  Visit Vitals   • /70   • Pulse 73   • Temp 98.8 °F (37.1 °C) (Oral)   • Resp 20   • Ht 63\" (160 cm)   • Wt 105 lb 6.4 oz (47.8 kg)   • SpO2 100%   • BMI 18.67 kg/m2       General: No acute distress noted.  Cardiovascular: S1 + S2. Seems regular this morning.   Respiratory: No respiratory distress noted, but she is on BiPAP. No significant wheezing heard. No crackles noted.  Extremities: No edema noted.  Neurologic: AAOx3. Was able to follow commands.   Skin: Appeared dry     Labs: reviewed. ABG reviewed    .  Assessment & Recommendations/Plan:   1. Acute hypercarbic respiratory failure  2. Unlikely Pneumonia  3. Severe COPD.  4. Continued smoking.  5. Atrial fibrillation with RVR. Resolved.  6. Chronic Resp Failure.      Continue PO Steroids for a total of 4-7 days.      Can be discharged home once Non Invasive Ventilation unit has been arranged. She needs a follow-up scheduled with us in 3-4 weeks.         MALDONADO Monahan  02/24/17  9:17 AM      Patient was seen and examined independently. I reviewed the patient's records with the APRN. Imaging studies and labs were reviewed, as appropriate. Plan discussed with APRN in detail. I agree with the plan, as outlined.    Yvon Fam MD  02/24/17  9:44 AM        "

## 2017-02-24 NOTE — PROGRESS NOTES
Continued Stay Note  MANJULA Roberson     Patient Name: Elizabeth Fulton  MRN: [id number]  Today's Date: 2/24/2017    Admit Date: 2/17/2017          Discharge Plan       02/24/17 1354    Case Management/Social Work Plan    Plan 2/24/17 Received call from Kayleigh at [facility], [employer] has been approved and will try to deliver to home today.  [place] office of [facility] is taking care of DME.  Kayleigh's Cell # is .  Per Dr Spann plan discharge  home tomorrow.               Discharge Codes     None        Expected Discharge Date and Time     Expected Discharge Date Expected Discharge Time    Feb 24, 2017             Yesy Nielsen

## 2017-02-24 NOTE — PLAN OF CARE
Problem: Pressure Ulcer Risk (Glenn Scale) (Adult,Obstetrics,Pediatric)  Goal: Skin Integrity  Outcome: Ongoing (interventions implemented as appropriate)

## 2017-02-24 NOTE — NURSING NOTE
BARBARA Teaching for COPD  Instructed on what it is, causes, signs and symptoms and treatment with teachback  Pt currently wearing BIPAP and gave pt encouragement over the importance of this and that it is good to wear while awake to get use to the mask

## 2017-02-24 NOTE — PROGRESS NOTES
INPATIENT PROGRESS NOTE    Date of Admission: 2/17/2017  Length of Stay: 7  Primary Care Physician: Young Nugent,     Subjective   HPI: This is a 66-year-old female with history of COPD on home oxygen at 3 L, coronary artery disease, hypertension was transferred from [facility] emergency room with acute hypoxic and hypercapnic respiratory failure    Interval History:  02/24/17 - Patient seen and examined. She seems to have reached maximum hospital improvement.  Her lung disease is end-stage.  By going on on biPAP she is beginning her period of ventilator dependence.  No new complaints.  Denies fevers/chills/N/V/D, worsening SOA or chest pain.  She would like to be discharged tomorrow.    Review Of Systems:  Otherwise complete ROS is negative except as mentioned above    Objective      Temp:  [97.8 °F (36.6 °C)-98.8 °F (37.1 °C)] 98.2 °F (36.8 °C)  Heart Rate:  [68-86] 86  Resp:  [18-23] 20  BP: (128-183)/(8-99) 183/99    Gen: Alert, appropriate, pleasant and interactive  HEENT: EOMI, ATNC, MMM  Neck: Supple  Heart: S1S2, irregular  Lungs: Clear with the exception of a few faint wheezes  Abdomen: Soft, NTND, BS+  Extremities: Warm, well-perfused, + pulses  Skin: P/W/D  Neuro: A/O x3, speech clear    Results Review:    I have reviewed the labs, radiology results and diagnostic studies.    Lab Results (last 24 hours)     Procedure Component Value Units Date/Time    Blood Gas, Arterial [[id number]]  (Abnormal) Collected:  02/24/17 0818    Specimen:  Arterial Blood Updated:  02/24/17 0819     Site Arterial: right brachial      Bassam's Test Positive      pH, Arterial 7.419 pH units      pCO2, Arterial 60.7 (H) mm Hg      pO2, Arterial 36.8 (L) mm Hg      HCO3, Arterial 39.3 (H) mmol/L      Base Excess, Arterial 14.8 mmol/L      O2 Saturation, Arterial 70.9 %      Hemoglobin, Blood Gas 11.3 (L) g/dL      Barometric Pressure for Blood Gas 728 mmHg      Modality BiPAP      FIO2 40 %     Blood Gas,  Arterial [[id number]]  (Abnormal) Collected:  02/24/17 0818    Specimen:  Arterial Blood Updated:  02/24/17 0819     Site Arterial: right brachial      Bassam's Test Positive      pH, Arterial 7.419 pH units      pCO2, Arterial 60.7 (H) mm Hg      pO2, Arterial 36.8 (L) mm Hg      HCO3, Arterial 39.3 (H) mmol/L      Base Excess, Arterial 14.8 mmol/L      O2 Saturation, Arterial 70.9 %      Hemoglobin, Blood Gas 11.3 (L) g/dL      Barometric Pressure for Blood Gas 728 mmHg      Modality BiPAP      FIO2 40 %             I have reviewed the medications.      Current Facility-Administered Medications:   •  acetaminophen (TYLENOL) tablet 650 mg, 650 mg, Oral, Q4H PRN, Shakeel Machado MD  •  ammonium lactate (LAC-HYDRIN) 12 % lotion, , Topical, BID PRN, Shakeel Machado MD  •  aspirin chewable tablet 81 mg, 81 mg, Oral, Daily, Shakeel Machado MD, 81 mg at 02/24/17 0930  •  atenolol (TENORMIN) tablet 100 mg, 100 mg, Oral, BID, Joel Goetz MD, 100 mg at 02/24/17 1719  •  budesonide (PULMICORT) nebulizer solution 0.5 mg, 0.5 mg, Nebulization, BID - RT, Shakeel Machado MD, 0.5 mg at 02/24/17 2009  •  CHAPSTICK 1 each, 1 each, Apply externally, PRN, Shakeel Machado MD, 1 each at 02/20/17 2032  •  diltiaZEM CD (CARDIZEM CD) 24 hr capsule 180 mg, 180 mg, Oral, Q24H, Joel Goetz MD, 180 mg at 02/24/17 1153  •  docusate sodium (COLACE) capsule 100 mg, 100 mg, Oral, BID, Shakeel Machado MD, 100 mg at 02/24/17 1719  •  enoxaparin (LOVENOX) syringe 40 mg, 40 mg, Subcutaneous, Q12H, Shakeel Machado MD, 40 mg at 02/24/17 1154  •  escitalopram (LEXAPRO) tablet 20 mg, 20 mg, Oral, Daily, Shakeel Machado MD, 20 mg at 02/24/17 0934  •  fluticasone (FLONASE) 50 MCG/ACT nasal spray 2 spray, 2 spray, Nasal, Daily, Shakeel Machado MD, 2 spray at 02/24/17 1043  •  guaiFENesin (MUCINEX) 12 hr tablet 600 mg, 600 mg, Oral, BID, Shakeel Machado MD, 600 mg at 02/24/17 1719  •  hydrALAZINE (APRESOLINE) injection 10 mg, 10 mg, Intravenous, Q6H PRN, Nadine Kapoor DO, 10 mg at  02/20/17 0846  •  ipratropium-albuterol (DUO-NEB) nebulizer solution 3 mL, 3 mL, Nebulization, Q4H PRN, Shakeel Machado MD, 3 mL at 02/24/17 0431  •  ipratropium-albuterol (DUO-NEB) nebulizer solution 3 mL, 3 mL, Nebulization, Q6H - RT, Shakeel Machado MD, 3 mL at 02/24/17 2009  •  lisinopril (PRINIVIL,ZESTRIL) tablet 10 mg, 10 mg, Oral, Daily, Yvon Fam MD, 10 mg at 02/24/17 1153  •  LORazepam (ATIVAN) tablet 1 mg, 1 mg, Oral, BID, Shakeel Machado MD, 1 mg at 02/24/17 0930  •  LORazepam (ATIVAN) tablet 1 mg, 1 mg, Oral, Nightly PRN, Nadine Kapoor DO, 1 mg at 02/24/17 2049  •  melatonin tablet 6 mg, 6 mg, Oral, Nightly PRN, Sal Spann MD  •  methylPREDNISolone sodium succinate (SOLU-Medrol) injection 40 mg, 40 mg, Intravenous, Q12H, Shakeel Machado MD, 40 mg at 02/24/17 1152  •  nicotine (NICODERM CQ) 14 MG/24HR patch 1 patch, 1 patch, Transdermal, Q24H, Shakeel Machado MD, 1 patch at 02/24/17 1156  •  ondansetron (ZOFRAN) tablet 4 mg, 4 mg, Oral, Q6H PRN **OR** ondansetron ODT (ZOFRAN-ODT) disintegrating tablet 4 mg, 4 mg, Oral, Q6H PRN **OR** ondansetron (ZOFRAN) injection 4 mg, 4 mg, Intravenous, Q6H PRN, Shakeel Machado MD  •  pantoprazole (PROTONIX) EC tablet 40 mg, 40 mg, Oral, Q AM, Shakeel Machado MD, 40 mg at 02/24/17 0540  •  pravastatin (PRAVACHOL) tablet 40 mg, 40 mg, Oral, Nightly, Shakeel Machado MD, 40 mg at 02/24/17 2049  •  sodium chloride (OCEAN) nasal spray 1 spray, 1 spray, Each Nare, PRN, Nadine Kapoor DO, 1 spray at 02/17/17 2106  •  sodium chloride 0.9 % bolus 1,000 mL, 1,000 mL, Intravenous, Once, Shakeel Machado MD  •  sodium chloride 0.9 % flush 1-10 mL, 1-10 mL, Intravenous, PRN, Shakeel Machado MD, 10 mL at 02/20/17 2220    Assessment/Plan     Assessment/Problem List  Active Problems:    Acute exacerbation of chronic obstructive pulmonary disease (COPD)    Acute hypercapnic respiratory failure    Acute on chronic respiratory failure with hypoxia    Pneumonia of right lower lobe due to  infectious organism    Cigarette nicotine dependence with nicotine-induced disorder    Plan     1. Acute hypercapnic respiratory failure, present on admission.  2. Acute on chronic hypoxic respiratory failure, present on admission.  3. Acute COPD exacerbation, present on admission.  4. Right lower lobe bacterial pneumonia, community-acquired, present on admission.  5. Acute atrial fibrillation with rapid ventricular rate.  6. Essential hypertension.  7. Coronary artery disease.  8. Nicotine dependence.  9. Hepatitis, uncertain etiology, possibly related to statin use.  10. Moderate malnutrition with BMI of 17.     Plan:      Acute Hypercapnic/acute on chronic hypoxic respiratory failure  AE COPD  RLL PNA  - Continue supplemental oxygen, bronchodilator nebulization, budesonide, IV Solu-Medrol as well as mucolytic agents.   - Antibiotics completed     Atrial Fibrillation  - ASA 81mg  - Cardizem/atenolol  - cardiology following     HTN  - atenolol/cardizem/lisinopril     Hepatitis  - etiology undetermined   - trending down         Plan: Probable discharge home with biPAP tomorrow.      Sal Spann MD 02/24/17 8:54 PM

## 2017-02-24 NOTE — PLAN OF CARE
Problem: NPPV/CPAP (Adult)  Intervention: Monitor/Manage Anxiety Related to NPPV/CPAP    02/23/17 2200   Coping Strategies   Trust Relationship/Rapport care explained;emotional support provided;empathic listening provided       Intervention: Prevent Aspiration During Therapy    02/23/17 2200   Positioning   Head Of Bed (HOB) Position HOB elevated         Goal: Signs and Symptoms of Listed Potential Problems Will be Absent or Manageable (NPPV/CPAP)  Outcome: Ongoing (interventions implemented as appropriate)    02/23/17 2200   NPPV/CPAP   Problems Assessed (NPPV/CPAP) situational response;hypoxia/hypoxemia;dry mucous membranes;skin breakdown   Problems Present (NPPV/CPAP) none

## 2017-02-24 NOTE — PLAN OF CARE
Problem: NPPV/CPAP (Adult)  Goal: Signs and Symptoms of Listed Potential Problems Will be Absent or Manageable (NPPV/CPAP)  Outcome: Ongoing (interventions implemented as appropriate)

## 2017-02-24 NOTE — PROGRESS NOTES
Joel Goetz MD  CARDIOLOGY FOLLOW UP NOTE    PATIENT: Elizabeth Fulton                                                   DATE: 17   306/[birth year]  : 1950     PRIMARY PHYSICIAN: oYung Nugent MD    Reason for consult: History of atrial fibrillation with rapid ventricular response, acute and chronic hypoxemic respiratory failure, borderline cardiac markers and accelerated hypertension    Subjective: Patient was able to ambulate short distances and has noticed gradual improvement in her shortness of hair.  Patient has been compliant with ostial pressure breathing  and at this stage denies significant shortness of hair.  Patient does have history of orthopnea although she denies PND's and has not had significant dependent edema.    Patient has maintained normal sinus rhythm and has not had recurrent paroxysmal atrial fibrillation and she has not required antiarrhythmic therapy though she is on high-dose beta blocker therapy and calcium antagonist therapy.      Patient, who was noticed to have significant elevation of blood pressure, has had improved blood pressure control on current regimen.      Patient, who has potential CAD, denies any chest pain, pressure or tightness as noted previously patient has been more or less bed ridden.      Her abdominal complaints have improved as well.      PAST MEDICAL HISTORY:     1. Atherosclerotic cardiovascular disease.  a. Peripheral arterial disease. Bilateral femoral bruits, especially right, with no definitive intermittent claudication.  b. Moderately high likelihood of underlying coronary artery disease based on risk profile and evidence of peripheral arterial disease with no prior scintigraphic or angiographic studies. On echocardiographic studies performed today, patient did not have any wall motion abnormalities.  c. Potential renovascular disease.  d. Potential mesenteric disease.  2. History of longstanding hypertension with hypertensive heart disease.  "Patient apparently has been hypertensive for several decades, but is not known to have definitive underlying renal parenchymal and renovascular disease. She has been on ACE inhibitor therapy and diuretic therapy.  3. Longstanding dyslipidemia.  4. History of advanced COPD with known respiratory failure, with suggestion of borderline RV function. Her RV function however was normal and right-sided filling pressures were normal as well.  Patient has previously undergone CT scan of the P protocol which was negative.  5. History of mild aortic sclerosis and mild mitral insufficiency.  6. Paroxysmal atrial dysrhythmia with no definitive known sustained dysrhythmias. Patient with anemia, has not been on antithrombotic therapy in the past.  7. Potential GI hemorrhage.  8. History of anxiety state and depression and history of prior colon malignancies status post chemotherapy with radiation.    PAST SURGICAL HISTORY:   1. Appendectomy.  2. Cholecystectomy.    SOCIAL HISTORY:  Patient had worked as a . She is retired and she is a . She has moved from [place] to live with her daughter in [place]. Patient continues to smoke possibly half pack to a pack per day.    FAMILY HISTORY: Not contributory    PHYSICAL EXAMINATION:  GENERAL: Pale-looking elderly female  Visit Vitals   • /70   • Pulse 73   • Temp 98.8 °F (37.1 °C) (Oral)   • Resp 20   • Ht 63\" (160 cm)   • Wt 105 lb 6.4 oz (47.8 kg)   • SpO2 100%   • BMI 18.67 kg/m2    Body mass index is 18.67 kg/(m^2). HEENT:  Head: Atraumatic, normocephalic, No tenderness over paranasal sinuses, external nares normal. No oral or nasal mucosal lesion. Sclera non-icteric ocular movements are normal with pupils reacting both to light and accommodations. Ocular fundi not seen. NECK: Carotid upstroke is normal, there are no carotid bruits, no JVD, no thyromegaly, no cervical or axillary lymphadenopathy. HEART: [place] beat is not displaced, no thrill is appreciated and " both heart sounds are normal.  There is no murmur or rub audible. BRONCHOPULMONARY: Patient has markedly diminished air entry with bronchovesicular sounds. No adventious sounds audible. GI & : Soft, no tenderness elicited and no viscera are palpable. Bowel sounds are positive and there is no renal bruits audible. EXTREMITIES:  Distal vessels could not be palpated there are no femoral bruits audible. Patient does not have dependent edema. DERM: No skin rash. CNS: No gross motor neurological deficit. MUSCULOSKELETAL: No joint swelling notice and patient has normal range of motion in lower extremity.       Intake/Output Summary (Last 24 hours) at 02/24/17 1028  Last data filed at 02/24/17 0400   Gross per 24 hour   Intake    720 ml   Output    400 ml   Net    320 ml     Wt Readings from Last 7 Encounters:   02/24/17 105 lb 6.4 oz (47.8 kg)     LABS:     Results from last 7 days  Lab Units 02/21/17  0446 02/19/17  0430 02/18/17  0508   WBC 10*3/mm3 8.40 7.04 8.20   HEMOGLOBIN g/dL 10.4* 9.3* 9.2*   HEMATOCRIT % 33.2* 30.0* 28.8*   PLATELETS 10*3/mm3 293 260 267                     Results from last 7 days  Lab Units 02/18/17  0508 02/17/17  1312   CK TOTAL U/L  --  32   TROPONIN I ng/mL 0.018 0.046*     No results found for: HGBA1C    Results from last 7 days  Lab Units 02/19/17  0431   TSH mIU/mL <0.015*   FREE T4 ng/dL 1.61           RADIOLOGY:   Imaging Results (last 24 hours)     ** No results found for the last 24 hours. **          No Known Allergies    aspirin 81 mg Oral Daily   atenolol 100 mg Oral BID   budesonide 0.5 mg Nebulization BID - RT   ceftriaxone 1 g Intravenous Q24H   diltiaZEM 90 mg Oral Q6H   docusate sodium 100 mg Oral BID   enoxaparin 40 mg Subcutaneous Q12H   escitalopram 20 mg Oral Daily   fluticasone 2 spray Nasal Daily   guaiFENesin 600 mg Oral BID   ipratropium-albuterol 3 mL Nebulization Q6H - RT   lisinopril 10 mg Oral Daily   LORazepam 1 mg Oral BID   methylPREDNISolone sodium succinate  40 mg Intravenous Q12H   nicotine 1 patch Transdermal Q24H   pantoprazole 40 mg Oral Q AM   pravastatin 40 mg Oral Nightly   sodium chloride 1,000 mL Intravenous Once     ASSESSMENT /PLAN:    1. Patient who has multiple substrates for, has had paroxysmal atrial fibrillation with rapid ventricular response prompting multiple AV blockers and as noted earlier, in general, patient has maintained normal sinus rhythm without any antiarrhythmic therapy.  Patient was felt not to be candidate for antithrombotic therapy on account of melena.  Patient does have have several substrates for atrial fibrillation with history of occasional palpitation in the past.  Although there is no historic data suggesting sustained dysrhythmias prior to current hospitalization , it is very likely that the patient would develop recurrent dysrhythmia on that this stage she would not merit antiarrhythmic therapy as noted earlier.  Patient is currently on AV blocker therapy including high-dose beta blocker and calcium antagonist therapy , which will be switched to long-acting formulation.   2. Patient, who has high likelihood of underlying coronary artery disease, has had history of  borderline cardiac markers. In all likelihood, given suggestion of mild sepsis syndrome, it may reflect acute systemic inflammatory response versus RV strain as she does have prior history of borderline RV function. Understandably, on account of risk profile, especially given evidence of peripheral arterial disease, patient has moderate if not higher potential for underlying coronary artery disease.  However it was felt that in the absence of a new symptomatology, she would not necessarily merit invasive workup at this stage but as an outpatient, perfusion study would still be relevant.  Patient has substantial potential for cardiovascular morbidity and mortality.  Patient would not necessarily merit long acting nitrates which has been discontinued.    3. Patient has  had elevation of blood pressure in the recent past which was thought to be driven by steroid therapy but now it appears to have fairly stable  blood pressure.  She is currently on ACE inhibitor therapy, beta blocker therapy and calcium antagonist therapy which would be switched to long-acting formulation.  She would not necessarily merit workup for renovascular disease at this stage given excellent blood pressure control with the current regimen.   4. Risk reduction.  Patient was again apprised of need for absolute smoking cessation .  At this stage patient does appear to have decided not to smoke again.  Apparently patient has been on Chantix in the past.  5. Patient has history of advanced chronic obstructive pulmonary disease with frequent exacerbation with acute on chronic hypoxemic respiratory failure with altered mental status secondary to hypercapnia for which patient under care of pulmonary services.  Based on recent ABGs her hypercapnia has improved substantially.  Patient was documented to have very hyperdynamic LV function with pulmonary hypertension though filling pressures appeared appropriate.  I doubt any cardiac contribution to her poor respiratory status.  Patient may be eventually considered candidate for pulmonary rehabilitation.  6.  Additional diagnoses include chronic anxiety state for which she may merit more aggressive sedative regimen.                     In closing, I sincerely appreciate opportunity to participate in care of this patient. If I can be of any further assistance with the management of this patient, please don’t hesitate to contact me.    DARRELL OROZCO M.D. [facility]

## 2017-02-24 NOTE — PROGRESS NOTES
INPATIENT PROGRESS NOTE    Date of Admission: 2/17/2017  Length of Stay: 6  Primary Care Physician: Young Nugent DO    Subjective   HPI:    Interval History:  02/23/17 - Seen and examined.  Breathing has improved further.  Patient has minimal wheezing bilaterally this AM on exam.  She continues to have a minimally productive cough. She had no problems overnight and tolerated biPAP well yesterday.  BiPAP needs for discharge discussed with case management.  Appetite continues to improve.  She consumed two Boost with each of her meals today.  Smoking cessation discussed at length.  We will not attempt to decrease her nicotine addiction anytime soon, just attempt to transfer it away from smoke inhalation.    Denies fever/chills, no N/V/D.   Review Of Systems:  Otherwise complete ROS is negative except as mentioned above    Objective      Temp:  [97.8 °F (36.6 °C)-98.4 °F (36.9 °C)] 97.8 °F (36.6 °C)  Heart Rate:  [58-79] 73  Resp:  [16-23] 18  BP: (118-165)/(6-86) 149/83    Gen: Alert, appropriate, pleasant and interactive  HEENT: EOMI, ATNC, MMM  Neck: Supple  Heart: S1S2, RRR  Lungs: very few scattered wheezes  Abdomen: Soft, NTND, BS+  Extremities: Warm, well-perfused, + pulses  Skin: P/W/D  Neuro: A/O x3, speech clear    Results Review:    I have reviewed the labs, radiology results and diagnostic studies.    Lab Results (last 24 hours)     Procedure Component Value Units Date/Time    POC Glucose Fingerstick [[id number]]  (Abnormal) Collected:  02/22/17 2007    Specimen:  Blood Updated:  02/22/17 2155     Glucose 205 (H) mg/dL       Serial Number: [id number]    : [id number]       Comprehensive Metabolic Panel [[id number]]  (Abnormal) Collected:  02/23/17 0538    Specimen:  Blood Updated:  02/23/17 0701     Glucose 116 (H) mg/dL      BUN 31 (H) mg/dL       Specimen hemolyzed. Results may be affected.        Creatinine 0.40 (L) mg/dL      Sodium 144 mmol/L      Potassium 4.1 mmol/L       Specimen hemolyzed.   Results may be affected.        Chloride 103 mmol/L      CO2 35.0 (H) mmol/L      Calcium 8.7 mg/dL      Total Protein 5.6 (L) g/dL      Albumin 3.20 (L) g/dL      ALT (SGPT) 78 (H) U/L       Specimen hemolyzed.  Results may be affected.        AST (SGOT) 34 U/L       Specimen hemolyzed.  Results may be affected.        Alkaline Phosphatase 52 U/L       Specimen hemolyzed. Results may be affected.        Total Bilirubin 0.4 mg/dL      eGFR Non African Amer >150 mL/min/1.73      Globulin 2.4 gm/dL      A/G Ratio 1.3 g/dL      BUN/Creatinine Ratio 77.5 (H)      Anion Gap 10.1 mmol/L     Narrative:       Abnormal estimated GFR should be followed by more specific studies to confirm end stage chronic renal disease. The equation used for calculation may not be accurate for patients less than 19 years old, greater than 70 years old, patients at extremes of weight, malnutrition, or with acute renal dysfunction.          BLOOD CULTURE   Date Value Ref Range Status   02/17/2017 No growth at 5 days  Final        MRSA SCREEN CX   Date Value Ref Range Status   02/17/2017   Final    No Methicillin Resistant Staphylococcus aureus isolated               I have reviewed the medications.      Current Facility-Administered Medications:   •  acetaminophen (TYLENOL) tablet 650 mg, 650 mg, Oral, Q4H PRN, Shakeel Machado MD  •  ammonium lactate (LAC-HYDRIN) 12 % lotion, , Topical, BID PRN, Shakeel Machado MD  •  aspirin chewable tablet 81 mg, 81 mg, Oral, Daily, Shakeel Machado MD, 81 mg at 02/23/17 0849  •  atenolol (TENORMIN) tablet 100 mg, 100 mg, Oral, BID, Joel Goezt MD, 100 mg at 02/23/17 1726  •  budesonide (PULMICORT) nebulizer solution 0.5 mg, 0.5 mg, Nebulization, BID - RT, Shakeel Machado MD, 0.5 mg at 02/23/17 1828  •  cefTRIAXone (ROCEPHIN) 1 g/50 mL 0.9% NS VTB (mbp), 1 g, Intravenous, Q24H, Shakeel Machado MD, 1 g at 02/23/17 1139  •  CHAPSTICK 1 each, 1 each, Apply externally, PRN, Shakeel Machado MD, 1 each at 02/20/17 2032  •   diltiaZEM (CARDIZEM) tablet 90 mg, 90 mg, Oral, Q6H, Joel Goetz MD, 90 mg at 02/23/17 1727  •  docusate sodium (COLACE) capsule 100 mg, 100 mg, Oral, BID, Shakeel Machado MD, 100 mg at 02/23/17 1727  •  enoxaparin (LOVENOX) syringe 40 mg, 40 mg, Subcutaneous, Q12H, Shakeel Machado MD, 40 mg at 02/23/17 1139  •  escitalopram (LEXAPRO) tablet 20 mg, 20 mg, Oral, Daily, Shakeel Machado MD, 20 mg at 02/23/17 0848  •  fluticasone (FLONASE) 50 MCG/ACT nasal spray 2 spray, 2 spray, Nasal, Daily, Shakeel Machado MD, 2 spray at 02/23/17 0849  •  guaiFENesin (MUCINEX) 12 hr tablet 600 mg, 600 mg, Oral, BID, Shakeel Machado MD, 600 mg at 02/23/17 1727  •  hydrALAZINE (APRESOLINE) injection 10 mg, 10 mg, Intravenous, Q6H PRN, Nadine Kapoor DO, 10 mg at 02/20/17 0846  •  ipratropium-albuterol (DUO-NEB) nebulizer solution 3 mL, 3 mL, Nebulization, Q4H PRN, Shakeel Machado MD, 3 mL at 02/23/17 1551  •  ipratropium-albuterol (DUO-NEB) nebulizer solution 3 mL, 3 mL, Nebulization, Q6H - RT, Shakeel Machado MD, 3 mL at 02/23/17 1828  •  lisinopril (PRINIVIL,ZESTRIL) tablet 10 mg, 10 mg, Oral, Daily, Yvon Fam MD, 10 mg at 02/23/17 1140  •  LORazepam (ATIVAN) tablet 1 mg, 1 mg, Oral, BID, Shakeel Machado MD, 1 mg at 02/23/17 1727  •  LORazepam (ATIVAN) tablet 1 mg, 1 mg, Oral, Nightly PRN, Nadine Kapoor DO, 1 mg at 02/23/17 2137  •  melatonin tablet 6 mg, 6 mg, Oral, Nightly PRN, Sal Spann MD  •  methylPREDNISolone sodium succinate (SOLU-Medrol) injection 40 mg, 40 mg, Intravenous, Q12H, Shakeel Machado MD, 40 mg at 02/23/17 1140  •  nicotine (NICODERM CQ) 14 MG/24HR patch 1 patch, 1 patch, Transdermal, Q24H, Shakeel Machado MD, 1 patch at 02/23/17 1142  •  ondansetron (ZOFRAN) tablet 4 mg, 4 mg, Oral, Q6H PRN **OR** ondansetron ODT (ZOFRAN-ODT) disintegrating tablet 4 mg, 4 mg, Oral, Q6H PRN **OR** ondansetron (ZOFRAN) injection 4 mg, 4 mg, Intravenous, Q6H PRN, Shakeel Machado MD  •  pantoprazole (PROTONIX) EC tablet 40 mg, 40 mg, Oral,  Q AM, Shakeel Machado MD, 40 mg at 02/23/17 0536  •  pravastatin (PRAVACHOL) tablet 40 mg, 40 mg, Oral, Nightly, Shakeel Machado MD, 40 mg at 02/23/17 2136  •  sodium chloride (OCEAN) nasal spray 1 spray, 1 spray, Each Nare, PRN, Nadine Kapoor, , 1 spray at 02/17/17 2106  •  sodium chloride 0.9 % bolus 1,000 mL, 1,000 mL, Intravenous, Once, Shakeel Machado MD  •  sodium chloride 0.9 % flush 1-10 mL, 1-10 mL, Intravenous, PRN, Shakeel Machado MD, 10 mL at 02/20/17 2220    Assessment/Plan     Assessment/Problem List  Active Problems:    Acute exacerbation of chronic obstructive pulmonary disease (COPD)    Acute hypercapnic respiratory failure    Acute on chronic respiratory failure with hypoxia    Pneumonia of right lower lobe due to infectious organism    Cigarette nicotine dependence with nicotine-induced disorder    Plan     1. Acute hypercapnic respiratory failure, present on admission.  2. Acute on chronic hypoxic respiratory failure, present on admission.  3. Acute COPD exacerbation, present on admission.  4. Right lower lobe bacterial pneumonia, community-acquired, present on admission.  5. Acute atrial fibrillation with rapid ventricular rate.  6. Essential hypertension.  7. Coronary artery disease.  8. Nicotine dependence.  9. Hepatitis, uncertain etiology, possibly related to statin use.  10. Moderate malnutrition with BMI of 17.     Plan:      Acute Hypercapnic/acute on chronic hypoxic respiratory failure  AE COPD  RLL PNA  - Continue supplemental oxygen, bronchodilator nebulization, budesonide, IV Solu-Medrol as well as mucolytic agents.   - Azith/ceftriaxone/vanc     Atrial Fibrillation  - ASA 81mg  - Cardizem/atenolol  - cardiology following     HTN  - atenolol/cardizem/lisinopril     Hepatitis  - improving    Continue treatment as above  AM labs      Sal Spann MD 02/23/17 9:41 PM

## 2017-02-25 PROCEDURE — 94799 UNLISTED PULMONARY SVC/PX: CPT

## 2017-02-25 PROCEDURE — 25010000002 ENOXAPARIN PER 10 MG: Performed by: INTERNAL MEDICINE

## 2017-02-25 PROCEDURE — 94660 CPAP INITIATION&MGMT: CPT

## 2017-02-25 PROCEDURE — 25010000002 METHYLPREDNISOLONE PER 40 MG: Performed by: INTERNAL MEDICINE

## 2017-02-25 PROCEDURE — 99231 SBSQ HOSP IP/OBS SF/LOW 25: CPT | Performed by: HOSPITALIST

## 2017-02-25 RX ORDER — DILTIAZEM HYDROCHLORIDE 240 MG/1
240 CAPSULE, COATED, EXTENDED RELEASE ORAL
Status: DISCONTINUED | OUTPATIENT
Start: 2017-02-26 | End: 2017-02-26 | Stop reason: HOSPADM

## 2017-02-25 RX ADMIN — ATENOLOL 100 MG: 50 TABLET ORAL at 17:33

## 2017-02-25 RX ADMIN — ESCITALOPRAM OXALATE 20 MG: 20 TABLET ORAL at 08:59

## 2017-02-25 RX ADMIN — ASPIRIN 81 MG 81 MG: 81 TABLET ORAL at 08:59

## 2017-02-25 RX ADMIN — LORAZEPAM 1 MG: 0.5 TABLET ORAL at 09:00

## 2017-02-25 RX ADMIN — IPRATROPIUM BROMIDE AND ALBUTEROL SULFATE 3 ML: .5; 3 SOLUTION RESPIRATORY (INHALATION) at 12:34

## 2017-02-25 RX ADMIN — NICOTINE 1 PATCH: 14 PATCH TRANSDERMAL at 11:53

## 2017-02-25 RX ADMIN — GUAIFENESIN 600 MG: 600 TABLET, EXTENDED RELEASE ORAL at 09:00

## 2017-02-25 RX ADMIN — BUDESONIDE 0.5 MG: 0.5 INHALANT RESPIRATORY (INHALATION) at 06:52

## 2017-02-25 RX ADMIN — ENOXAPARIN SODIUM 40 MG: 40 INJECTION SUBCUTANEOUS at 11:59

## 2017-02-25 RX ADMIN — PRAVASTATIN SODIUM 40 MG: 20 TABLET ORAL at 21:15

## 2017-02-25 RX ADMIN — LORAZEPAM 1 MG: 0.5 TABLET ORAL at 17:34

## 2017-02-25 RX ADMIN — LORAZEPAM 1 MG: 0.5 TABLET ORAL at 21:23

## 2017-02-25 RX ADMIN — DILTIAZEM HYDROCHLORIDE 180 MG: 180 CAPSULE, COATED, EXTENDED RELEASE ORAL at 09:00

## 2017-02-25 RX ADMIN — IPRATROPIUM BROMIDE AND ALBUTEROL SULFATE 3 ML: .5; 3 SOLUTION RESPIRATORY (INHALATION) at 06:50

## 2017-02-25 RX ADMIN — ENOXAPARIN SODIUM 40 MG: 40 INJECTION SUBCUTANEOUS at 23:56

## 2017-02-25 RX ADMIN — ACETAMINOPHEN 650 MG: 325 TABLET, FILM COATED ORAL at 22:50

## 2017-02-25 RX ADMIN — IPRATROPIUM BROMIDE AND ALBUTEROL SULFATE 3 ML: .5; 3 SOLUTION RESPIRATORY (INHALATION) at 01:05

## 2017-02-25 RX ADMIN — FLUTICASONE PROPIONATE 2 SPRAY: 50 SPRAY, METERED NASAL at 09:01

## 2017-02-25 RX ADMIN — METHYLPREDNISOLONE SODIUM SUCCINATE 40 MG: 40 INJECTION, POWDER, FOR SOLUTION INTRAMUSCULAR; INTRAVENOUS at 23:56

## 2017-02-25 RX ADMIN — BUDESONIDE 0.5 MG: 0.5 INHALANT RESPIRATORY (INHALATION) at 18:50

## 2017-02-25 RX ADMIN — IPRATROPIUM BROMIDE AND ALBUTEROL SULFATE 3 ML: .5; 3 SOLUTION RESPIRATORY (INHALATION) at 18:50

## 2017-02-25 RX ADMIN — METHYLPREDNISOLONE SODIUM SUCCINATE 40 MG: 40 INJECTION, POWDER, FOR SOLUTION INTRAMUSCULAR; INTRAVENOUS at 00:37

## 2017-02-25 RX ADMIN — LISINOPRIL 10 MG: 10 TABLET ORAL at 11:51

## 2017-02-25 RX ADMIN — METHYLPREDNISOLONE SODIUM SUCCINATE 40 MG: 40 INJECTION, POWDER, FOR SOLUTION INTRAMUSCULAR; INTRAVENOUS at 11:51

## 2017-02-25 RX ADMIN — ATENOLOL 100 MG: 50 TABLET ORAL at 09:00

## 2017-02-25 RX ADMIN — PANTOPRAZOLE SODIUM 40 MG: 40 TABLET, DELAYED RELEASE ORAL at 05:10

## 2017-02-25 RX ADMIN — GUAIFENESIN 600 MG: 600 TABLET, EXTENDED RELEASE ORAL at 17:35

## 2017-02-25 RX ADMIN — DOCUSATE SODIUM 100 MG: 100 CAPSULE, LIQUID FILLED ORAL at 09:00

## 2017-02-25 RX ADMIN — ENOXAPARIN SODIUM 40 MG: 40 INJECTION SUBCUTANEOUS at 00:37

## 2017-02-25 NOTE — PROGRESS NOTES
Joel Goetz MD  CARDIOLOGY FOLLOW UP NOTE    PATIENT: Elizabeth Fulton                                                   DATE: 17   306/1  : 1950     PRIMARY PHYSICIAN: Young Nugent MD    Reason for consult: History of atrial fibrillation with rapid ventricular response, acute and chronic hypoxemic respiratory failure, borderline cardiac markers and accelerated hypertension    Subjective: Patient was able to ambulate short distances and has noticed gradual improvement in her shortness of hair.  Patient has been compliant with positive pressure pressure breathing  and at this stage denies significant shortness of air.  Patient does have history of orthopnea although she denies PND's and has not had significant dependent edema.    Patient has maintained normal sinus rhythm but had sinus tachycardia last night but fortunately patient despite rather advanced underlying lung disease, has not had recurrent paroxysmal atrial fibrillation and she has not required antiarrhythmic therapy though she is on high-dose beta blocker therapy and calcium antagonist therapy which she has tolerated fairly well.      Patient again last night had elevation of blood pressure after her short acting Cardizem was switched to long-acting Cardizem.      Patient, who has potential CAD, denies any chest pain, pressure or tightness as noted previously patient has been more or less bed ridden.      Her abdominal complaints have improved as well.      PAST MEDICAL HISTORY:     1. Atherosclerotic cardiovascular disease.  a. Peripheral arterial disease. Bilateral femoral bruits, especially right, with no definitive intermittent claudication.  b. Moderately high likelihood of underlying coronary artery disease based on risk profile and evidence of peripheral arterial disease with no prior scintigraphic or angiographic studies. On echocardiographic studies performed today, patient did not have any wall motion  "abnormalities.  c. Potential renovascular disease.  d. Potential mesenteric disease.  2. History of longstanding hypertension with hypertensive heart disease. Patient apparently has been hypertensive for several decades, but is not known to have definitive underlying renal parenchymal and renovascular disease. She has been on ACE inhibitor therapy and diuretic therapy.  3. Longstanding dyslipidemia.  4. History of advanced COPD with known respiratory failure, with suggestion of borderline RV function. Her RV function however was normal and right-sided filling pressures were normal as well.  Patient has previously undergone CT scan of the P protocol which was negative.  5. History of mild aortic sclerosis and mild mitral insufficiency.  6. Paroxysmal atrial dysrhythmia with no definitive known sustained dysrhythmias. Patient with anemia, has not been on antithrombotic therapy in the past.  7. Potential GI hemorrhage.  8. History of anxiety state and depression and history of prior colon malignancies status post chemotherapy with radiation.    PAST SURGICAL HISTORY:   1. Appendectomy.  2. Cholecystectomy.    SOCIAL HISTORY:  Patient had worked as a . She is retired and she is a . She has moved from [place] to live with her daughter in [place]. Patient continues to smoke possibly half pack to a pack per day.    FAMILY HISTORY: Not contributory    PHYSICAL EXAMINATION:  GENERAL: Pale-looking elderly female  Visit Vitals   • /76   • Pulse 80   • Temp 98.8 °F (37.1 °C) (Oral)   • Resp 18   • Ht 63\" (160 cm)   • Wt 109 lb 3.2 oz (49.5 kg)   • SpO2 98%   • BMI 19.34 kg/m2    Body mass index is 19.34 kg/(m^2). HEENT:  Head: Atraumatic, normocephalic, No tenderness over paranasal sinuses, external nares normal. No oral or nasal mucosal lesion. Sclera non-icteric ocular movements are normal with pupils reacting both to light and accommodations. Ocular fundi not seen. NECK: Carotid upstroke is normal, " there are no carotid bruits, no JVD, no thyromegaly, no cervical or axillary lymphadenopathy. HEART: [place] beat is not displaced, no thrill is appreciated and both heart sounds are normal.  There is no murmur or rub audible. BRONCHOPULMONARY: Patient has markedly diminished air entry with bronchovesicular sounds. No adventious sounds audible. GI & : Soft, no tenderness elicited and no viscera are palpable. Bowel sounds are positive and there is no renal bruits audible. EXTREMITIES:  Distal vessels could not be palpated there are no femoral bruits audible. Patient does not have dependent edema. DERM: No skin rash. CNS: No gross motor neurological deficit. MUSCULOSKELETAL: No joint swelling notice and patient has normal range of motion in lower extremity.       Intake/Output Summary (Last 24 hours) at 02/25/17 0959  Last data filed at 02/25/17 0400   Gross per 24 hour   Intake    120 ml   Output    300 ml   Net   -180 ml     Wt Readings from Last 7 Encounters:   02/25/17 109 lb 3.2 oz (49.5 kg)     LABS:     Results from last 7 days  Lab Units 02/21/17  0446 02/19/17  0430   WBC 10*3/mm3 8.40 7.04   HEMOGLOBIN g/dL 10.4* 9.3*   HEMATOCRIT % 33.2* 30.0*   PLATELETS 10*3/mm3 293 260                         Invalid input(s): TROPONININT  No results found for: HGBA1C    Results from last 7 days  Lab Units 02/19/17  0431   TSH mIU/mL <0.015*   FREE T4 ng/dL 1.61           RADIOLOGY:   Imaging Results (last 24 hours)     ** No results found for the last 24 hours. **          No Known Allergies    aspirin 81 mg Oral Daily   atenolol 100 mg Oral BID   budesonide 0.5 mg Nebulization BID - RT   diltiaZEM  mg Oral Q24H   docusate sodium 100 mg Oral BID   enoxaparin 40 mg Subcutaneous Q12H   escitalopram 20 mg Oral Daily   fluticasone 2 spray Nasal Daily   guaiFENesin 600 mg Oral BID   ipratropium-albuterol 3 mL Nebulization Q6H - RT   lisinopril 10 mg Oral Daily   LORazepam 1 mg Oral BID   methylPREDNISolone sodium  succinate 40 mg Intravenous Q12H   nicotine 1 patch Transdermal Q24H   pantoprazole 40 mg Oral Q AM   pravastatin 40 mg Oral Nightly   sodium chloride 1,000 mL Intravenous Once     ASSESSMENT /PLAN:    1. Patient who has multiple substrates for, has had paroxysmal atrial fibrillation with rapid ventricular response prompting multiple AV blockers and as noted earlier, in general, patient has maintained normal sinus rhythm without any antiarrhythmic therapy.  She did develop sinus tachycardia as outlined earlier and has not had any recurrent dysrhythmia despite advanced COPD which is quite reassuring.  Patient was felt not to be candidate for antithrombotic therapy on account of melena.  Patient does have have several substrates for atrial fibrillation with history of occasional palpitation in the past.  Although there is no historic data suggesting sustained dysrhythmias prior to current hospitalization , it is very likely that the patient would develop recurrent dysrhythmia on that this stage she would not merit antiarrhythmic therapy as noted earlier.  Patient is currently on AV blocker therapy including high-dose beta blocker and calcium antagonist therapy , which has been switched to long-acting formulation.  She would merit up titrated dosages of calcium tenderness therapy.  2. Patient, who has high likelihood of underlying coronary artery disease, has had history of  borderline cardiac markers. In all likelihood, given suggestion of mild sepsis syndrome, it may reflect acute systemic inflammatory response versus RV strain as she does have prior history of borderline RV function. Understandably, on account of risk profile, especially given evidence of peripheral arterial disease, patient has moderate if not higher potential for underlying coronary artery disease.  However it was felt that in the absence of a new symptomatology, she would not necessarily merit invasive workup at this stage but as an outpatient,  perfusion study would still be relevant.  Patient has substantial potential for cardiovascular morbidity and mortality.  Patient would not necessarily merit long acting nitrates which has been discontinued.    3. Patient has had elevation of blood pressure in the recent past which was thought to be driven by steroid therapy but she had another spike in blood pressure last night after substitution of for short-acting diltiazem with long-acting diltiazem which would likely merit up titration .  In addition patient is also on ACE inhibitor therapy and beta blocker therapy .  It was felt that at this stage patient would not necessarily merit workup for renovascular disease at this stage given excellent blood pressure control with the current regimen.   4. Risk reduction.  Patient was again apprised of need for absolute smoking cessation .  At this stage patient does appear to have decided not to smoke again.  Apparently patient has been on Chantix in the past and was on nicotine replacement during current hospitalization.  5. Patient has history of advanced chronic obstructive pulmonary disease with frequent exacerbation with acute on chronic hypoxemic respiratory failure with prior history of altered mental status secondary to hypercapnia for which patient under care of pulmonary services.  Patient was documented to have very hyperdynamic LV function with pulmonary hypertension though filling pressures appeared appropriate.  I doubt any cardiac contribution to her poor respiratory status.  Patient may be eventually considered candidate for pulmonary rehabilitation.  6.  Additional diagnoses include chronic anxiety state for which she may merit more aggressive sedative regimen.                     In closing, I sincerely appreciate opportunity to participate in care of this patient. If I can be of any further assistance with the management of this patient, please don’t hesitate to contact me.    DARRELL OROZCO M.D. [facility]

## 2017-02-25 NOTE — PLAN OF CARE
Problem: NPPV/CPAP (Adult)  Goal: Signs and Symptoms of Listed Potential Problems Will be Absent or Manageable (NPPV/CPAP)  Outcome: Ongoing (interventions implemented as appropriate)    02/25/17 1724   NPPV/CPAP   Problems Assessed (NPPV/CPAP) all   Problems Present (NPPV/CPAP) none

## 2017-02-25 NOTE — NURSING NOTE
Dr Goetz notified of pts rhythm going between a fib and SR with heartbeat from 80 -130s. Dr. Goetz also notified of pt's elevated blood pressure 163/84... No added orders and pt will continue to be monitored.

## 2017-02-25 NOTE — PLAN OF CARE
Problem: NPPV/CPAP (Adult)  Intervention: Prevent Aspiration During Therapy    02/25/17 0210   Positioning   Head Of Bed (HOB) Position HOB elevated       Intervention: Assess/Manage Patient Tolerance of Noninvasive Ventilation    02/25/17 0210   Respiratory Interventions   Airway/Ventilation Management airway patency maintained;pulmonary hygiene promoted       Intervention: Prevent/Minimize Device Friction/Shearing and Pressure Points    02/25/17 0210   Respiratory Interventions   NPPV/CPAP Maintenance other (see comments)  (proper mask fit maintained)         Goal: Signs and Symptoms of Listed Potential Problems Will be Absent or Manageable (NPPV/CPAP)  Outcome: Ongoing (interventions implemented as appropriate)    02/25/17 0210   NPPV/CPAP   Problems Assessed (NPPV/CPAP) hypoxia/hypoxemia;dry mucous membranes;situational response;skin breakdown   Problems Present (NPPV/CPAP) none

## 2017-02-25 NOTE — PLAN OF CARE
Problem: Pressure Ulcer Risk (Glenn Scale) (Adult,Obstetrics,Pediatric)  Goal: Skin Integrity  Outcome: Ongoing (interventions implemented as appropriate)    02/25/17 3607   Pressure Ulcer Risk (Glenn Scale) (Adult,Obstetrics,Pediatric)   Skin Integrity making progress toward outcome

## 2017-02-25 NOTE — PLAN OF CARE
Problem: Patient Care Overview (Adult)  Goal: Plan of Care Review  Outcome: Ongoing (interventions implemented as appropriate)    02/25/17 7511   Coping/Psychosocial Response Interventions   Plan Of Care Reviewed With patient   Outcome Evaluation   Outcome Summary/Follow up Plan up took shower today destiny well breathing easy restful states she is feeling better    Patient Care Overview   Progress improving

## 2017-02-25 NOTE — PLAN OF CARE
Problem: Pneumonia (Adult)  Goal: Signs and Symptoms of Listed Potential Problems Will be Absent or Manageable (Pneumonia)  Outcome: Ongoing (interventions implemented as appropriate)    02/25/17 1637   Pneumonia   Problems Assessed (Pneumonia) all   Problems Present (Pneumonia) respiratory compromise

## 2017-02-26 VITALS
TEMPERATURE: 97.9 F | OXYGEN SATURATION: 96 % | RESPIRATION RATE: 18 BRPM | BODY MASS INDEX: 19.26 KG/M2 | HEART RATE: 84 BPM | DIASTOLIC BLOOD PRESSURE: 90 MMHG | WEIGHT: 108.7 LBS | HEIGHT: 63 IN | SYSTOLIC BLOOD PRESSURE: 149 MMHG

## 2017-02-26 PROCEDURE — 25010000002 METHYLPREDNISOLONE PER 40 MG: Performed by: INTERNAL MEDICINE

## 2017-02-26 PROCEDURE — 94799 UNLISTED PULMONARY SVC/PX: CPT

## 2017-02-26 PROCEDURE — 25010000002 ENOXAPARIN PER 10 MG: Performed by: INTERNAL MEDICINE

## 2017-02-26 PROCEDURE — 99239 HOSP IP/OBS DSCHRG MGMT >30: CPT | Performed by: HOSPITALIST

## 2017-02-26 PROCEDURE — 94660 CPAP INITIATION&MGMT: CPT

## 2017-02-26 RX ORDER — DILTIAZEM HYDROCHLORIDE 240 MG/1
240 CAPSULE, COATED, EXTENDED RELEASE ORAL
Qty: 30 CAPSULE | Refills: 1 | Status: SHIPPED | OUTPATIENT
Start: 2017-02-26

## 2017-02-26 RX ORDER — CHLORDIAZEPOXIDE HYDROCHLORIDE 5 MG/1
10 CAPSULE, GELATIN COATED ORAL 3 TIMES DAILY
Qty: 90 CAPSULE | Refills: 1 | Status: SHIPPED | OUTPATIENT
Start: 2017-02-26 | End: 2017-02-27

## 2017-02-26 RX ORDER — AMMONIUM LACTATE 12 G/100G
LOTION TOPICAL 2 TIMES DAILY PRN
Qty: 396 G | Refills: 0 | Status: SHIPPED | OUTPATIENT
Start: 2017-02-26

## 2017-02-26 RX ORDER — AZITHROMYCIN 500 MG/1
500 TABLET, FILM COATED ORAL DAILY
Qty: 2 TABLET | Refills: 0 | Status: SHIPPED | OUTPATIENT
Start: 2017-02-26

## 2017-02-26 RX ADMIN — FLUTICASONE PROPIONATE 2 SPRAY: 50 SPRAY, METERED NASAL at 09:23

## 2017-02-26 RX ADMIN — LISINOPRIL 10 MG: 10 TABLET ORAL at 12:03

## 2017-02-26 RX ADMIN — ASPIRIN 81 MG 81 MG: 81 TABLET ORAL at 09:23

## 2017-02-26 RX ADMIN — ENOXAPARIN SODIUM 40 MG: 40 INJECTION SUBCUTANEOUS at 12:03

## 2017-02-26 RX ADMIN — IPRATROPIUM BROMIDE AND ALBUTEROL SULFATE 3 ML: .5; 3 SOLUTION RESPIRATORY (INHALATION) at 12:41

## 2017-02-26 RX ADMIN — PANTOPRAZOLE SODIUM 40 MG: 40 TABLET, DELAYED RELEASE ORAL at 05:02

## 2017-02-26 RX ADMIN — IPRATROPIUM BROMIDE AND ALBUTEROL SULFATE 3 ML: .5; 3 SOLUTION RESPIRATORY (INHALATION) at 07:01

## 2017-02-26 RX ADMIN — IPRATROPIUM BROMIDE AND ALBUTEROL SULFATE 3 ML: .5; 3 SOLUTION RESPIRATORY (INHALATION) at 17:23

## 2017-02-26 RX ADMIN — LORAZEPAM 1 MG: 0.5 TABLET ORAL at 17:02

## 2017-02-26 RX ADMIN — ESCITALOPRAM OXALATE 20 MG: 20 TABLET ORAL at 09:23

## 2017-02-26 RX ADMIN — IPRATROPIUM BROMIDE AND ALBUTEROL SULFATE 3 ML: .5; 3 SOLUTION RESPIRATORY (INHALATION) at 00:32

## 2017-02-26 RX ADMIN — GUAIFENESIN 600 MG: 600 TABLET, EXTENDED RELEASE ORAL at 17:01

## 2017-02-26 RX ADMIN — DOCUSATE SODIUM 100 MG: 100 CAPSULE, LIQUID FILLED ORAL at 09:24

## 2017-02-26 RX ADMIN — ATENOLOL 100 MG: 50 TABLET ORAL at 09:22

## 2017-02-26 RX ADMIN — GUAIFENESIN 600 MG: 600 TABLET, EXTENDED RELEASE ORAL at 09:22

## 2017-02-26 RX ADMIN — BUDESONIDE 0.5 MG: 0.5 INHALANT RESPIRATORY (INHALATION) at 07:01

## 2017-02-26 RX ADMIN — METHYLPREDNISOLONE SODIUM SUCCINATE 40 MG: 40 INJECTION, POWDER, FOR SOLUTION INTRAMUSCULAR; INTRAVENOUS at 12:03

## 2017-02-26 RX ADMIN — ATENOLOL 100 MG: 50 TABLET ORAL at 17:01

## 2017-02-26 RX ADMIN — DILTIAZEM HYDROCHLORIDE 240 MG: 240 CAPSULE, COATED, EXTENDED RELEASE ORAL at 09:23

## 2017-02-26 RX ADMIN — LORAZEPAM 1 MG: 0.5 TABLET ORAL at 09:22

## 2017-02-26 RX ADMIN — NICOTINE 1 PATCH: 14 PATCH TRANSDERMAL at 12:19

## 2017-02-26 NOTE — PLAN OF CARE
Problem: Pneumonia (Adult)  Goal: Signs and Symptoms of Listed Potential Problems Will be Absent or Manageable (Pneumonia)  Outcome: Ongoing (interventions implemented as appropriate)    02/26/17 1647   Pneumonia   Problems Assessed (Pneumonia) respiratory compromise   Problems Present (Pneumonia) respiratory compromise

## 2017-02-26 NOTE — PLAN OF CARE
Problem: NPPV/CPAP (Adult)  Intervention: Prevent Aspiration During Therapy    02/26/17 0328   Positioning   Head Of Bed (HOB) Position HOB elevated       Intervention: Assess/Manage Patient Tolerance of Noninvasive Ventilation    02/26/17 0328   Respiratory Interventions   Airway/Ventilation Management airway patency maintained;pulmonary hygiene promoted       Intervention: Prevent/Minimize Device Friction/Shearing and Pressure Points    02/26/17 0328   Respiratory Interventions   NPPV/CPAP Maintenance other (see comments)  (home mask in use)         Goal: Signs and Symptoms of Listed Potential Problems Will be Absent or Manageable (NPPV/CPAP)  Outcome: Ongoing (interventions implemented as appropriate)    02/26/17 0328   NPPV/CPAP   Problems Assessed (NPPV/CPAP) hypoxia/hypoxemia;dry mucous membranes;situational response;skin breakdown   Problems Present (NPPV/CPAP) none

## 2017-02-26 NOTE — PLAN OF CARE
Problem: Patient Care Overview (Adult)  Goal: Plan of Care Review  Outcome: Ongoing (interventions implemented as appropriate)    02/26/17 6711   Coping/Psychosocial Response Interventions   Plan Of Care Reviewed With patient   Outcome Evaluation   Outcome Summary/Follow up Plan up to chair alert destiny diet wearing bipap states she is feeling some better reamins sob    Patient Care Overview   Progress improving

## 2017-02-26 NOTE — PLAN OF CARE
Problem: Patient Care Overview (Adult)  Goal: Plan of Care Review  Outcome: Ongoing (interventions implemented as appropriate)    02/26/17 0303   Coping/Psychosocial Response Interventions   Plan Of Care Reviewed With patient   Outcome Evaluation   Outcome Summary/Follow up Plan less SOA episodes, wearing bi-pap most of the time   Patient Care Overview   Progress improving       Goal: Adult Individualization and Mutuality  Outcome: Ongoing (interventions implemented as appropriate)  Goal: Discharge Needs Assessment  Outcome: Ongoing (interventions implemented as appropriate)    Problem: Pneumonia (Adult)  Goal: Signs and Symptoms of Listed Potential Problems Will be Absent or Manageable (Pneumonia)  Outcome: Ongoing (interventions implemented as appropriate)    Problem: NPPV/CPAP (Adult)  Goal: Signs and Symptoms of Listed Potential Problems Will be Absent or Manageable (NPPV/CPAP)  Outcome: Ongoing (interventions implemented as appropriate)    Problem: Pressure Ulcer Risk (Glenn Scale) (Adult,Obstetrics,Pediatric)  Goal: Skin Integrity  Outcome: Ongoing (interventions implemented as appropriate)

## 2017-02-26 NOTE — DISCHARGE INSTR - ACTIVITY
Activity as  Tolerated    At  Risk  For  Falls   So inst re     Fall   Prevention     Continue   bipap  Use  At  Bedtime  And    With  Naps  And  As  Needed     Oxygen    4  Liters   When  Not   Wearing  bipap

## 2017-02-26 NOTE — PROGRESS NOTES
INPATIENT PROGRESS NOTE    Date of Admission: 2/17/2017  Length of Stay: 8  Primary Care Physician: Young Nugent DO    Subjective   Interval History:  02/25/17 - Patient seen and examined.  She is on bipap.  Denies SOA, chest pain, N/V/D.  Cardiology following.  Pulmonary has signed off, rec steroid taper, home okay once Non Invasive Ventilation unit has been arranged.  Patient has no new complaints.  Appetite seems to have improved with steroids.    Review Of Systems:  Otherwise complete ROS is negative except as mentioned above    Objective      Temp:  [97.9 °F (36.6 °C)-98.9 °F (37.2 °C)] 97.9 °F (36.6 °C)  Heart Rate:  [74-89] 89  Resp:  [16-22] 18  BP: (145-175)/(76-94) 145/78    Gen: Alert, appropriate, pleasant and interactive  HEENT: EOMI, ATNC, MMM  Neck: Supple  Heart: S1S2, RRR  Lungs: CTA bilaterally, no wheezes, rales, or rhonchi  Abdomen: Soft, NTND, BS+  Extremities: Warm, well-perfused, + pulses  Skin: P/W/D  Neuro: A/O x3, speech clear    Results Review:    I have reviewed the labs, radiology results and diagnostic studies.      Results from last 7 days  Lab Units 02/23/17  0538 02/21/17 0446 02/19/17  0431   SODIUM mmol/L 144 145 147*   POTASSIUM mmol/L 4.1 3.7 3.5   CHLORIDE mmol/L 103 107 114*   TOTAL CO2 mmol/L 35.0* 32.0* 27.0   BUN mg/dL 31* 32* 22*   CREATININE mg/dL 0.40* 0.50* 0.50*   CALCIUM mg/dL 8.7 8.8 8.7   BILIRUBIN mg/dL 0.4 0.3  --    ALK PHOS U/L 52 57  --    ALT (SGPT) U/L 78* 121*  --    AST (SGOT) U/L 34 47*  --    GLUCOSE mg/dL 116* 116* 144*         Results from last 7 days  Lab Units 02/21/17  0446 02/19/17  0430   WBC 10*3/mm3 8.40 7.04   HEMOGLOBIN g/dL 10.4* 9.3*   HEMATOCRIT % 33.2* 30.0*   PLATELETS 10*3/mm3 293 260       I have reviewed the medications.      Current Facility-Administered Medications:   •  acetaminophen (TYLENOL) tablet 650 mg, 650 mg, Oral, Q4H PRN, Shakeel Machado MD  •  ammonium lactate (LAC-HYDRIN) 12 % lotion, , Topical, BID PRN, Shakeel  MD Jeannette  •  aspirin chewable tablet 81 mg, 81 mg, Oral, Daily, Shakeel Machado MD, 81 mg at 02/25/17 0859  •  atenolol (TENORMIN) tablet 100 mg, 100 mg, Oral, BID, Joel Goetz MD, 100 mg at 02/25/17 1733  •  budesonide (PULMICORT) nebulizer solution 0.5 mg, 0.5 mg, Nebulization, BID - RT, Shakeel Machado MD, 0.5 mg at 02/25/17 1850  •  CHAPSTICK 1 each, 1 each, Apply externally, PRN, Shakeel Machado MD, 1 each at 02/20/17 2032  •  [START ON 2/26/2017] diltiaZEM CD (CARDIZEM CD) 24 hr capsule 240 mg, 240 mg, Oral, Q24H, Joel Goetz MD  •  docusate sodium (COLACE) capsule 100 mg, 100 mg, Oral, BID, Shakeel Machado MD, 100 mg at 02/25/17 0900  •  enoxaparin (LOVENOX) syringe 40 mg, 40 mg, Subcutaneous, Q12H, Shakeel Machado MD, 40 mg at 02/25/17 1159  •  escitalopram (LEXAPRO) tablet 20 mg, 20 mg, Oral, Daily, Shakeel Machado MD, 20 mg at 02/25/17 0859  •  fluticasone (FLONASE) 50 MCG/ACT nasal spray 2 spray, 2 spray, Nasal, Daily, Shakeel Machado MD, 2 spray at 02/25/17 0901  •  guaiFENesin (MUCINEX) 12 hr tablet 600 mg, 600 mg, Oral, BID, Shakeel Machado MD, 600 mg at 02/25/17 1735  •  hydrALAZINE (APRESOLINE) injection 10 mg, 10 mg, Intravenous, Q6H PRN, Nadine Kapoor DO, 10 mg at 02/20/17 0846  •  ipratropium-albuterol (DUO-NEB) nebulizer solution 3 mL, 3 mL, Nebulization, Q4H PRN, Shakeel Machado MD, 3 mL at 02/24/17 0431  •  ipratropium-albuterol (DUO-NEB) nebulizer solution 3 mL, 3 mL, Nebulization, Q6H - RT, Shakeel Machado MD, 3 mL at 02/25/17 1850  •  lisinopril (PRINIVIL,ZESTRIL) tablet 10 mg, 10 mg, Oral, Daily, Yvon Fam MD, 10 mg at 02/25/17 1151  •  LORazepam (ATIVAN) tablet 1 mg, 1 mg, Oral, BID, Shakeel Machado MD, 1 mg at 02/25/17 1734  •  LORazepam (ATIVAN) tablet 1 mg, 1 mg, Oral, Nightly PRN, Nadine Kapoor DO, 1 mg at 02/24/17 2049  •  melatonin tablet 6 mg, 6 mg, Oral, Nightly PRN, Sal Spann MD  •  methylPREDNISolone sodium succinate (SOLU-Medrol) injection 40 mg, 40 mg, Intravenous, Q12H, Shakeel  MD Jeannette, 40 mg at 02/25/17 1151  •  nicotine (NICODERM CQ) 14 MG/24HR patch 1 patch, 1 patch, Transdermal, Q24H, Shakeel Machado MD, 1 patch at 02/25/17 1153  •  ondansetron (ZOFRAN) tablet 4 mg, 4 mg, Oral, Q6H PRN **OR** ondansetron ODT (ZOFRAN-ODT) disintegrating tablet 4 mg, 4 mg, Oral, Q6H PRN **OR** ondansetron (ZOFRAN) injection 4 mg, 4 mg, Intravenous, Q6H PRN, Shakeel Machado MD  •  pantoprazole (PROTONIX) EC tablet 40 mg, 40 mg, Oral, Q AM, Shakeel Machado MD, 40 mg at 02/25/17 0510  •  pravastatin (PRAVACHOL) tablet 40 mg, 40 mg, Oral, Nightly, Shakeel Machado MD, 40 mg at 02/24/17 2049  •  sodium chloride (OCEAN) nasal spray 1 spray, 1 spray, Each Nare, PRN, Nadine Kapoor DO, 1 spray at 02/17/17 2106  •  sodium chloride 0.9 % bolus 1,000 mL, 1,000 mL, Intravenous, Once, Shakeel Machado MD  •  sodium chloride 0.9 % flush 1-10 mL, 1-10 mL, Intravenous, PRN, Shakeel Machado MD, 10 mL at 02/20/17 2220    Assessment/Plan     Problem List  Active Problems:    Acute exacerbation of chronic obstructive pulmonary disease (COPD)    Acute hypercapnic respiratory failure    Acute on chronic respiratory failure with hypoxia    Pneumonia of right lower lobe due to infectious organism    Cigarette nicotine dependence with nicotine-induced disorder      Assessment/Plan:     Acute Hypercapnic/acute on chronic hypoxic respiratory failure  AE COPD  RLL PNA  - Continue supplemental oxygen, bronchodilator nebulization, budesonide, IV Solu-Medrol as well as mucolytic agents.   - Azith/ceftriaxone/vanc     Paroxysmal Atrial Fibrillation  - currently in NSR  - ASA 81mg  - Cardizem/atenolol  - cardiology following     HTN  - atenolol/cardizem/lisinopril  - stable     Hepatitis  - improving     Continue treatment as above  AM labs         Sal Spann MD 02/25/17 8:36 PM

## 2017-02-26 NOTE — PROGRESS NOTES
Joel Goetz MD  CARDIOLOGY FOLLOW UP NOTE    PATIENT: Elizabeth Fulton                                                   DATE: 17   306/1  : 1950     PRIMARY PHYSICIAN: Young Nugent MD    Reason for consult: History of atrial fibrillation with rapid ventricular response, acute and chronic hypoxemic respiratory failure, borderline cardiac markers and accelerated hypertension    Subjective: Patient has had significant improvement in her shortness of air since last night  and she was able to tolerate positive pressure pressure breathing  without major issues.  Patient, who denies significant shortness of air, has not ambulated significant distances.  Patient does have history of orthopnea although she denies PND's and has not had significant dependent edema.    Patient , has had sinus tachycardia last night without any recurrent paroxysmal atrial fibrillation and she has not required antiarrhythmic therapy though she is on high-dose beta blocker therapy and calcium antagonist therapy which she has tolerated fairly well.      Her blood pressure control has improved substantially.      Patient, who has potential coronary artery disease, denies any chest pain, pressure or tightness as noted previously patient has been more or less bed ridden.      Her abdominal complaints have improved as well.      PAST MEDICAL HISTORY:     1. Atherosclerotic cardiovascular disease.  a. Peripheral arterial disease. Bilateral femoral bruits, especially right, with no definitive intermittent claudication.  b. Moderately high likelihood of underlying coronary artery disease based on risk profile and evidence of peripheral arterial disease with no prior scintigraphic or angiographic studies. On echocardiographic studies performed today, patient did not have any wall motion abnormalities.  c. Potential renovascular disease.  d. Potential mesenteric disease.  2. History of longstanding hypertension with  "hypertensive heart disease. Patient apparently has been hypertensive for several decades, but is not known to have definitive underlying renal parenchymal and renovascular disease. She has been on ACE inhibitor therapy and diuretic therapy.  3. Longstanding dyslipidemia.  4. History of advanced COPD with known respiratory failure, with suggestion of borderline RV function. Her RV function however was normal and right-sided filling pressures were normal as well.  Patient has previously undergone CT scan of the P protocol which was negative.  5. History of mild aortic sclerosis and mild mitral insufficiency.  6. Paroxysmal atrial dysrhythmia with no definitive known sustained dysrhythmias. Patient with anemia, has not been on antithrombotic therapy in the past.  7. Potential GI hemorrhage.  8. History of anxiety state and depression and history of prior colon malignancies status post chemotherapy with radiation.    PAST SURGICAL HISTORY:   1. Appendectomy.  2. Cholecystectomy.    SOCIAL HISTORY:  Patient had worked as a . She is retired and she is a . She has moved from [place] to live with her daughter in [place]. Patient continues to smoke possibly half pack to a pack per day.    FAMILY HISTORY: Not contributory    PHYSICAL EXAMINATION:  GENERAL: Pale-looking elderly female  Visit Vitals   • /85   • Pulse 68   • Temp 98.9 °F (37.2 °C) (Oral)   • Resp 18   • Ht 63\" (160 cm)   • Wt 108 lb 11.2 oz (49.3 kg)   • SpO2 98%   • BMI 19.26 kg/m2    Body mass index is 19.26 kg/(m^2). HEENT:  Head: Atraumatic, normocephalic, No tenderness over paranasal sinuses, external nares normal. No oral or nasal mucosal lesion. Sclera non-icteric ocular movements are normal with pupils reacting both to light and accommodations. Ocular fundi not seen. NECK: Carotid upstroke is normal, there are no carotid bruits, no JVD, no thyromegaly, no cervical or axillary lymphadenopathy. HEART: [place] beat is not displaced, " no thrill is appreciated and both heart sounds are normal.  There is no murmur or rub audible. BRONCHOPULMONARY: Patient has markedly diminished air entry with bronchovesicular sounds. No adventious sounds audible. GI & : Soft, no tenderness elicited and no viscera are palpable. Bowel sounds are positive and there is no renal bruits audible. EXTREMITIES:  Distal vessels could not be palpated there are no femoral bruits audible. Patient does not have dependent edema. DERM: No skin rash. CNS: No gross motor neurological deficit. MUSCULOSKELETAL: No joint swelling notice and patient has normal range of motion in lower extremity.       Intake/Output Summary (Last 24 hours) at 02/26/17 0922  Last data filed at 02/26/17 0701   Gross per 24 hour   Intake    680 ml   Output    700 ml   Net    -20 ml     Wt Readings from Last 7 Encounters:   02/26/17 108 lb 11.2 oz (49.3 kg)     LABS:     Results from last 7 days  Lab Units 02/21/17  0446   WBC 10*3/mm3 8.40   HEMOGLOBIN g/dL 10.4*   HEMATOCRIT % 33.2*   PLATELETS 10*3/mm3 293                         Invalid input(s): TROPONININT  No results found for: HGBA1C            RADIOLOGY:   Imaging Results (last 24 hours)     ** No results found for the last 24 hours. **          No Known Allergies    aspirin 81 mg Oral Daily   atenolol 100 mg Oral BID   budesonide 0.5 mg Nebulization BID - RT   diltiaZEM  mg Oral Q24H   docusate sodium 100 mg Oral BID   enoxaparin 40 mg Subcutaneous Q12H   escitalopram 20 mg Oral Daily   fluticasone 2 spray Nasal Daily   guaiFENesin 600 mg Oral BID   ipratropium-albuterol 3 mL Nebulization Q6H - RT   lisinopril 10 mg Oral Daily   LORazepam 1 mg Oral BID   methylPREDNISolone sodium succinate 40 mg Intravenous Q12H   nicotine 1 patch Transdermal Q24H   pantoprazole 40 mg Oral Q AM   pravastatin 40 mg Oral Nightly   sodium chloride 1,000 mL Intravenous Once     ASSESSMENT /PLAN:    1. Patient who has multiple substrates for, has had paroxysmal  atrial fibrillation with rapid ventricular response prompting multiple AV blockers has had stable and normal sinus rhythm without recurrent atrial dysrhythmia without any antiarrhythmic therapy.  On occasion patient does have sinus tachycardia and given underlying advanced COPD she remains at risk of recurrent dysrhythmia.  Patient was felt not to be candidate for antithrombotic therapy on account of melena.  Patient does have have several substrates for atrial fibrillation with history of occasional palpitation in the past.  At this stage other than continued AV blocker therapy including high-dose beta blocker and calcium antagonist therapy, no further intervention would be relevant.  All these issues have been discussed with the patient in depth.  2. Patient, who has high likelihood of underlying coronary artery disease, has had history of  borderline cardiac markers. In all likelihood, given suggestion of mild sepsis syndrome, it may reflect acute systemic inflammatory response versus RV strain as she does have prior history of borderline RV function. Understandably, on account of risk profile, especially given evidence of peripheral arterial disease, patient has moderate if not higher potential for underlying coronary artery disease.  However it was felt that in the absence of a new symptomatology, she would not necessarily merit invasive workup at this stage but as an outpatient, perfusion study would still be relevant.  Patient has substantial potential for cardiovascular morbidity and mortality.  Patient would not necessarily merit long acting nitrates which has been discontinued.    3. Patient has had elevation of blood pressure in the recent past which was thought to be driven by steroid therapy .  In the setting of anxiety state patient does have occasional elevation of blood pressure but lately patient has not had any spike in blood pressure last night after substitution of for short-acting diltiazem with  long-acting diltiazem which would likely merit up titration .  In addition patient is also on ACE inhibitor therapy and beta blocker therapy .  It was felt that at this stage patient would not necessarily merit workup for renovascular disease at this stage given excellent blood pressure control with the current regimen.   4. Risk reduction.  Patient was again apprised of need for absolute smoking cessation .  At this stage patient does appear to have decided not to smoke again.  Apparently patient has been on Chantix in the past and was on nicotine replacement during current hospitalization.  5. Patient has history of advanced chronic obstructive pulmonary disease with frequent exacerbation with acute on chronic hypoxemic respiratory failure with prior history of altered mental status secondary to hypercapnia for which patient under care of pulmonary services.  Patient was documented to have very hyperdynamic LV function with pulmonary hypertension though filling pressures appeared appropriate.  I doubt any cardiac contribution to her poor respiratory status.  Patient may be eventually considered candidate for pulmonary rehabilitation.  6.  Additional diagnoses include chronic anxiety state with clinical improvement.                     In closing, I sincerely appreciate opportunity to participate in care of this patient. If I can be of any further assistance with the management of this patient, please don’t hesitate to contact me.    DARRELL OROZCO M.D. [facility]

## 2017-02-26 NOTE — PLAN OF CARE
Problem: Pressure Ulcer Risk (Glenn Scale) (Adult,Obstetrics,Pediatric)  Goal: Skin Integrity  Outcome: Ongoing (interventions implemented as appropriate)    02/26/17 5005   Pressure Ulcer Risk (Glenn Scale) (Adult,Obstetrics,Pediatric)   Skin Integrity making progress toward outcome

## 2017-02-26 NOTE — DISCHARGE SUMMARY
Date of Discharge:  2/26/2017    Discharge Diagnosis:  1. Acute on Chronic Hypoxic Respiratory Failure  2. Acute hypercapnic respiratory failure  3. AE COPD  3. RLL PNA  4. HTN  5. Acute Hepatitis    Presenting Problem  Acute hypercapnic respiratory failure [J96.02]     History of Present Illness:  This is a 66-year-old female with history of COPD on home oxygen at 3 L, coronary artery disease, hypertension was transferred from [facility] emergency room with acute hypoxic and hypercapnic respiratory failure. The history is obtained from the patient, her daughter as well as the medical chart.     The patient states that she was in her usual state of health until a few days ago when she started having increasing shortness of breath and cough. She continued to use nebulizers and oxygen at home without any improvement. Early this morning around 3 AM she got significantly worse and EMS was called. Patient was taken to the emergency room after she received albuterol nebulization and IV Solu-Medrol by the EMS. She was evaluated in the emergency room and her initial temperature was 99.6. Blood pressure was 221/108 and pulse was 103. She was saturating at 84% with a respiratory rate of 28. Patient was given duo neb and IV Levaquin. Her white count was noted to be 29,000. Her troponin was negative. Chest x-ray showed possible right lower lobe pneumonia. ABG done in the emergency room showed hypercapnia with a PCO2 of 74. She was placed on BiPAP with improvement in her CO2 levels. She was given 1 L of normal saline. Due to unavailability of ICU beds, she was transferred to [facility] intensive care unit.     Patient has history of COPD and has been on home oxygen for the last 2 years. She has duoneb at home and also uses Symbicort and Spiriva. She does not follow-up with pulmonology. She lives with her daughter and is independent in daily activities. She does not use CPAP at home.     Patient is  currently comfortable on BiPAP. She does have history of paroxysmal atrial fibrillation but is not on any anticoagulation. She was admitted to [facility] in December 2016 for COPD exacerbation and at that time she was told to have had a mild heart attack. She was supposed to follow-up with Dr. Goetz which she has not been able to.     After about an hour being in the ICU, she developed atrial fibrillation with rapid ventricular rate with her heart rate in the 170s. She was given IV Cardizem and has been started on oral Cardizem.    Hospital Course  Ms. Fulton was admitted as above.    Cardiology and Pulmonary were consulted.    Her respiratory illness was treated with antibiotics - azithromycin, ceftriaxone, and vancomycin.  She also received IV steroids as well as mucolytic agents, bronchodilators and budesonide nebulizers.  She initially required bipap therapy but was able to be weaned back to her baseline oxygen requirement of 3LNC.  She will be discharged home with a Trilogy machine for use while sleeping.    She did develop atrial fibrillation with RVR shortly after admission.  Cardiology was consulted and Dr. Goetz followed throughout her stay.  She was treated with cardizem and atenolol for rate control and is currently in sinus rhythm.  She is on a baby aspirin for thromboembolism prophylaxis.    Ms. Fulton does have a history of hypertension.  This was stable on the above agents plus lisinopril.    Ms. Fulton did struggle with adequate nutritional intake.  Dietician was consulted and she was provided supplements with meals.      Ms. Fulton was noted to have mild elevation of her LFTs at admission and this is nearly resolved.     At this time, she is stable for discharge home.        Consults:   Consults     Date and Time Order Name Status Description    2/17/2017 1316 Inpatient Consult to Cardiology      2/17/2017 1256 Inpatient Consult to Pulmonology            Pertinent Test  Results:   Lab Results (most recent)     Procedure Component Value Units Date/Time    Blood Gas, Arterial [[id number]]  (Abnormal) Collected:  02/17/17 1322    Specimen:  Arterial Blood Updated:  02/17/17 1323     Site Arterial: right radial      Bassam's Test Positive      pH, Arterial 7.389 pH units      pCO2, Arterial 35.0 mm Hg      pO2, Arterial 68.1 (L) mm Hg      HCO3, Arterial 21.1 (L) mmol/L      Base Excess, Arterial -3.8 mmol/L      O2 Saturation, Arterial 95.1 %      Hemoglobin, Blood Gas 8.6 (L) g/dL      Barometric Pressure for Blood Gas 731 mmHg      Modality BiPAP      FIO2 50 %     Troponin [[id number]]  (Abnormal) Collected:  02/17/17 1312    Specimen:  Blood Updated:  02/17/17 1405     Troponin I 0.046 (H) ng/mL     Narrative:       Normal Patient Upper Reference Limit (URL) (99th Percentile)=0.03 ng/mL   Non-AMI Illness Reference Limit=0.03-0.11 ng/mL   AMI Confirmation=0.12 ng/mL and above    CK [[id number]]  (Normal) Collected:  02/17/17 1312    Specimen:  Blood Updated:  02/17/17 1412     Creatine Kinase 32 U/L     Narrative:       The relative CKMB index is statistically unreliable if the CK is < or equal to 40 U/L.    CK-MB [[id number]]  (Abnormal) Collected:  02/17/17 1312    Specimen:  Blood Updated:  02/17/17 1412     CKMB 2.60 (C) ng/mL     Influenza Antigen [[id number]]  (Normal) Collected:  02/17/17 2030    Specimen:  Swab from Nasopharynx Updated:  02/17/17 2057     Influenza A Ag, EIA Negative      Influenza B Ag, EIA Negative     CBC Auto Differential [[id number]]  (Abnormal) Collected:  02/18/17 0508    Specimen:  Blood Updated:  02/18/17 0619     WBC 8.20 10*3/mm3      RBC 3.04 (L) 10*6/mm3      Hemoglobin 9.2 (L) g/dL      Hematocrit 28.8 (L) %      MCV 94.7 fL      MCH 30.3 pg      MCHC 31.9 g/dL      RDW 13.7 %      RDW-SD 47.8 fl      MPV 11.8 fL      Platelets 267 10*3/mm3      Neutrophil % 81.8 (H) %      Lymphocyte % 9.8 (L) %      Monocyte % 7.2 %      Eosinophil % 0.0 %      Basophil  % 0.2 %      Immature Grans % 1.0 (H) %      Neutrophils, Absolute 6.71 10*3/mm3      Lymphocytes, Absolute 0.80 10*3/mm3      Monocytes, Absolute 0.59 10*3/mm3      Eosinophils, Absolute 0.00 10*3/mm3      Basophils, Absolute 0.02 10*3/mm3      Immature Grans, Absolute 0.08 (H) 10*3/mm3      nRBC 0.0 /100 WBC     Magnesium [[id number]]  (Normal) Collected:  02/18/17 0508    Specimen:  Blood Updated:  02/18/17 0634     Magnesium 1.6 mg/dL     Comprehensive Metabolic Panel [[id number]]  (Abnormal) Collected:  02/18/17 0508    Specimen:  Blood Updated:  02/18/17 0634     Glucose 118 (H) mg/dL      BUN 25 (H) mg/dL      Creatinine 0.60 mg/dL      Sodium 145 mmol/L      Potassium 3.7 mmol/L      Chloride 112 (H) mmol/L      CO2 27.0 mmol/L      Calcium 8.9 mg/dL      Total Protein 5.5 (L) g/dL      Albumin 3.30 (L) g/dL      ALT (SGPT) 113 (H) U/L      AST (SGOT) 71 (H) U/L      Alkaline Phosphatase 62 U/L      Total Bilirubin 0.3 mg/dL      eGFR Non African Amer 99 mL/min/1.73      Globulin 2.2 gm/dL      A/G Ratio 1.5 g/dL      BUN/Creatinine Ratio 41.7 (H)      Anion Gap 9.7 mmol/L     Narrative:       Abnormal estimated GFR should be followed by more specific studies to confirm end stage chronic renal disease. The equation used for calculation may not be accurate for patients less than 19 years old, greater than 70 years old, patients at extremes of weight, malnutrition, or with acute renal dysfunction.    Troponin [[id number]]  (Normal) Collected:  02/18/17 0508    Specimen:  Blood Updated:  02/18/17 0645     Troponin I 0.018 ng/mL     Narrative:       Normal Patient Upper Reference Limit (URL) (99th Percentile)=0.03 ng/mL   Non-AMI Illness Reference Limit=0.03-0.11 ng/mL   AMI Confirmation=0.12 ng/mL and above    TSH [[id number]]  (Abnormal) Collected:  02/18/17 0508    Specimen:  Blood Updated:  02/18/17 0919     TSH <0.015 (L) mIU/mL     Blood Gas, Arterial [[id number]]  (Abnormal) Collected:  02/18/17 0727    Specimen:   Arterial Blood Updated:  02/18/17 1413     Site AO       Corrected result. Previous result was Arterial: left radial on 2/18/2017 at [facility] EST        Bassam's Test N/A       Corrected result. Previous result was Positive on 2/18/2017 at [facility] EST        pH, Arterial 0.000 pH units       Corrected result. Previous result was 7.302 pH units on 2/18/2017 at [facility] EST        pCO2, Arterial 0.0 (L) mm Hg       Corrected result. Previous result was 59.7 mm Hg on 2/18/2017 at [facility] EST        pO2, Arterial 0.0 (L) mm Hg       Corrected result. Previous result was 64.6 mm Hg on 2/18/2017 at [facility] EST        HCO3, Arterial 0.0 (L) mmol/L       Corrected result. Previous result was 29.5 mmol/L on 2/18/2017 at [facility] EST        Base Excess, Arterial 0.0 mmol/L       Corrected result. Previous result was 3.1 mmol/L on 2/18/2017 at [facility] EST        O2 Saturation, Arterial -- %       Corrected result. Previous result was 94.1 % on 2/18/2017 at [facility] EST        Hemoglobin, Blood Gas -- g/dL       Corrected result. Previous result was 13.4 g/dL on 2/18/2017 at [facility] EST        Barometric Pressure for Blood Gas 0 mmHg       Corrected result. Previous result was 731 mmHg on 2/18/2017 at [facility] EST        Modality Cannula       Corrected result. Previous result was BiPAP on 2/18/2017 at [facility] EST        FIO2 -- %       Corrected result. Previous result was 35 % on 2/18/2017 at [facility] EST       Respiratory Culture [[id number]] Collected:  02/18/17 1540    Specimen:  Sputum from Cough Updated:  02/18/17 1758     Respiratory Culture Rejected      Gram Stain Result Less than 10 WBCs per low power field              Greater than 20 Epithelial cells per low power field    CBC & Differential [[id number]] Collected:  02/19/17 0430    Specimen:  Blood Updated:  02/19/17 0587    Narrative:       The following orders were created for panel order CBC & Differential.  Procedure                               Abnormality         Status                     ---------                                -----------         ------                     CBC Auto Differential[[id number]]         Abnormal            Final result                 Please view results for these tests on the individual orders.    CBC Auto Differential [[id number]]  (Abnormal) Collected:  02/19/17 0430    Specimen:  Blood Updated:  02/19/17 0521     WBC 7.04 10*3/mm3      RBC 3.14 (L) 10*6/mm3      Hemoglobin 9.3 (L) g/dL      Hematocrit 30.0 (L) %      MCV 95.5 fL      MCH 29.6 pg      MCHC 31.0 g/dL      RDW 13.6 %      RDW-SD 47.8 fl      MPV 11.6 fL      Platelets 260 10*3/mm3      Neutrophil % 92.0 (H) %      Lymphocyte % 5.1 (L) %      Monocyte % 2.4 %      Eosinophil % 0.0 %      Basophil % 0.1 %      Immature Grans % 0.4 %      Neutrophils, Absolute 6.47 10*3/mm3      Lymphocytes, Absolute 0.36 (L) 10*3/mm3      Monocytes, Absolute 0.17 10*3/mm3      Eosinophils, Absolute 0.00 10*3/mm3      Basophils, Absolute 0.01 10*3/mm3      Immature Grans, Absolute 0.03 10*3/mm3      nRBC 0.0 /100 WBC     Basic Metabolic Panel [[id number]]  (Abnormal) Collected:  02/19/17 0431    Specimen:  Blood Updated:  02/19/17 0542     Glucose 144 (H) mg/dL      BUN 22 (H) mg/dL      Creatinine 0.50 (L) mg/dL      Sodium 147 (H) mmol/L      Potassium 3.5 mmol/L      Chloride 114 (H) mmol/L      CO2 27.0 mmol/L      Calcium 8.7 mg/dL      eGFR Non African Amer 123 mL/min/1.73      BUN/Creatinine Ratio 44.0 (H)      Anion Gap 9.5 mmol/L     Narrative:       Abnormal estimated GFR should be followed by more specific studies to confirm end stage chronic renal disease. The equation used for calculation may not be accurate for patients less than 19 years old, greater than 70 years old, patients at extremes of weight, malnutrition, or with acute renal dysfunction.    T4, Free [[id number]]  (Normal) Collected:  02/19/17 0431    Specimen:  Blood Updated:  02/19/17 0745     Free T4 1.61 ng/dL     TSH [[id number]]  (Abnormal) Collected:  02/19/17 0431     Specimen:  Blood Updated:  02/19/17 0750     TSH <0.015 (L) mIU/mL     MRSA Screen [[id number]]  (Normal) Collected:  02/17/17 1116    Specimen:  Swab from Nares Updated:  02/19/17 0915     MRSA SCREEN CX        No Methicillin Resistant Staphylococcus aureus isolated    CBC & Differential [[id number]] Collected:  02/21/17 0446    Specimen:  Blood Updated:  02/21/17 0530    Narrative:       The following orders were created for panel order CBC & Differential.  Procedure                               Abnormality         Status                     ---------                               -----------         ------                     CBC Auto Differential[[id number]]         Abnormal            Final result                 Please view results for these tests on the individual orders.    CBC Auto Differential [[id number]]  (Abnormal) Collected:  02/21/17 0446    Specimen:  Blood Updated:  02/21/17 0530     WBC 8.40 10*3/mm3      RBC 3.47 (L) 10*6/mm3      Hemoglobin 10.4 (L) g/dL      Hematocrit 33.2 (L) %      MCV 95.7 fL      MCH 30.0 pg      MCHC 31.3 g/dL      RDW 13.9 %      RDW-SD 48.5 fl      MPV 11.7 fL      Platelets 293 10*3/mm3      Neutrophil % 89.4 (H) %      Lymphocyte % 6.5 (L) %      Monocyte % 3.3 %      Eosinophil % 0.0 %      Basophil % 0.1 %      Immature Grans % 0.7 (H) %      Neutrophils, Absolute 7.50 (H) 10*3/mm3      Lymphocytes, Absolute 0.55 (L) 10*3/mm3      Monocytes, Absolute 0.28 10*3/mm3      Eosinophils, Absolute 0.00 10*3/mm3      Basophils, Absolute 0.01 10*3/mm3      Immature Grans, Absolute 0.06 10*3/mm3      nRBC 0.0 /100 WBC     Comprehensive Metabolic Panel [[id number]]  (Abnormal) Collected:  02/21/17 0446    Specimen:  Blood Updated:  02/21/17 0552     Glucose 116 (H) mg/dL      BUN 32 (H) mg/dL      Creatinine 0.50 (L) mg/dL      Sodium 145 mmol/L      Potassium 3.7 mmol/L      Chloride 107 mmol/L      CO2 32.0 (H) mmol/L      Calcium 8.8 mg/dL      Total Protein 5.5 (L) g/dL      Albumin  3.20 (L) g/dL      ALT (SGPT) 121 (H) U/L      AST (SGOT) 47 (H) U/L      Alkaline Phosphatase 57 U/L      Total Bilirubin 0.3 mg/dL      eGFR Non African Amer 123 mL/min/1.73      Globulin 2.3 gm/dL      A/G Ratio 1.4 g/dL      BUN/Creatinine Ratio 64.0 (H)      Anion Gap 9.7 mmol/L     Narrative:       Abnormal estimated GFR should be followed by more specific studies to confirm end stage chronic renal disease. The equation used for calculation may not be accurate for patients less than 19 years old, greater than 70 years old, patients at extremes of weight, malnutrition, or with acute renal dysfunction.    Blood Gas, Arterial [[id number]]  (Abnormal) Collected:  02/21/17 0635    Specimen:  Arterial Blood Updated:  02/21/17 0635     Site Arterial: right brachial      Bassam's Test Positive      pH, Arterial 7.426 pH units      pCO2, Arterial 47.5 (H) mm Hg      pO2, Arterial 65.3 (L) mm Hg      HCO3, Arterial 31.2 (H) mmol/L      Base Excess, Arterial 6.8 mmol/L      O2 Saturation, Arterial 94.8 %      Hemoglobin, Blood Gas 10.8 (L) g/dL      Barometric Pressure for Blood Gas 735 mmHg      Modality Cannula - Nasal      FIO2 36 %     POC Glucose Fingerstick [[id number]]  (Abnormal) Collected:  02/21/17 2030    Specimen:  Blood Updated:  02/21/17 2205     Glucose 146 (H) mg/dL       Serial Number: [id number]    : [id number]       POC Glucose Fingerstick [[id number]]  (Abnormal) Collected:  02/22/17 0705    Specimen:  Blood Updated:  02/22/17 0711     Glucose 213 (H) mg/dL       Serial Number: [id number]    : [id number]       Blood Gas, Arterial [[id number]]  (Abnormal) Collected:  02/22/17 0809    Specimen:  Arterial Blood Updated:  02/22/17 0809     Site Arterial: right brachial      Bassam's Test N/A      pH, Arterial 7.426 pH units      pCO2, Arterial 55.7 (H) mm Hg      pO2, Arterial 30.5 (L) mm Hg      HCO3, Arterial 36.7 (H) mmol/L      Base Excess, Arterial 12.3 mmol/L      O2 Saturation, Arterial 60.2 %       Hemoglobin, Blood Gas 10.8 (L) g/dL      Barometric Pressure for Blood Gas 732 mmHg      Modality Cannula - Nasal      FIO2 36 %     Blood Gas, Arterial [[id number]]  (Abnormal) Collected:  02/22/17 0809    Specimen:  Arterial Blood Updated:  02/22/17 0810     Site Arterial: right brachial      Bassam's Test N/A      pH, Arterial 7.426 pH units      pCO2, Arterial 55.7 (H) mm Hg      pO2, Arterial 30.5 (L) mm Hg      HCO3, Arterial 36.7 (H) mmol/L      Base Excess, Arterial 12.3 mmol/L      O2 Saturation, Arterial 60.2 %      Hemoglobin, Blood Gas 10.8 (L) g/dL      Barometric Pressure for Blood Gas 732 mmHg      Modality Cannula - Nasal      FIO2 36 %     POC Glucose Fingerstick [[id number]]  (Abnormal) Collected:  02/22/17 1108    Specimen:  Blood Updated:  02/22/17 1147     Glucose 149 (H) mg/dL       Serial Number: [id number]    : [id number]       Blood Culture [[id number]]  (Normal) Collected:  02/17/17 1311    Specimen:  Blood from Hand, Left Updated:  02/22/17 1401     Blood Culture No growth at 5 days     Blood Culture [[id number]]  (Normal) Collected:  02/17/17 1312    Specimen:  Blood from Hand, Left Updated:  02/22/17 1401     Blood Culture No growth at 5 days     POC Glucose Fingerstick [[id number]]  (Abnormal) Collected:  02/22/17 1648    Specimen:  Blood Updated:  02/22/17 1702     Glucose 152 (H) mg/dL       Serial Number: [id number]    : [id number]       POC Glucose Fingerstick [[id number]]  (Abnormal) Collected:  02/22/17 2007    Specimen:  Blood Updated:  02/22/17 2155     Glucose 205 (H) mg/dL       Serial Number: [id number]    : [id number]       Comprehensive Metabolic Panel [[id number]]  (Abnormal) Collected:  02/23/17 0538    Specimen:  Blood Updated:  02/23/17 0701     Glucose 116 (H) mg/dL      BUN 31 (H) mg/dL       Specimen hemolyzed. Results may be affected.        Creatinine 0.40 (L) mg/dL      Sodium 144 mmol/L      Potassium 4.1 mmol/L       Specimen hemolyzed.  Results may be affected.         Chloride 103 mmol/L      CO2 35.0 (H) mmol/L      Calcium 8.7 mg/dL      Total Protein 5.6 (L) g/dL      Albumin 3.20 (L) g/dL      ALT (SGPT) 78 (H) U/L       Specimen hemolyzed.  Results may be affected.        AST (SGOT) 34 U/L       Specimen hemolyzed.  Results may be affected.        Alkaline Phosphatase 52 U/L       Specimen hemolyzed. Results may be affected.        Total Bilirubin 0.4 mg/dL      eGFR Non African Amer >150 mL/min/1.73      Globulin 2.4 gm/dL      A/G Ratio 1.3 g/dL      BUN/Creatinine Ratio 77.5 (H)      Anion Gap 10.1 mmol/L     Narrative:       Abnormal estimated GFR should be followed by more specific studies to confirm end stage chronic renal disease. The equation used for calculation may not be accurate for patients less than 19 years old, greater than 70 years old, patients at extremes of weight, malnutrition, or with acute renal dysfunction.    Blood Gas, Arterial [[id number]]  (Abnormal) Collected:  02/24/17 0818    Specimen:  Arterial Blood Updated:  02/24/17 0819     Site Arterial: right brachial      Bassam's Test Positive      pH, Arterial 7.419 pH units      pCO2, Arterial 60.7 (H) mm Hg      pO2, Arterial 36.8 (L) mm Hg      HCO3, Arterial 39.3 (H) mmol/L      Base Excess, Arterial 14.8 mmol/L      O2 Saturation, Arterial 70.9 %      Hemoglobin, Blood Gas 11.3 (L) g/dL      Barometric Pressure for Blood Gas 728 mmHg      Modality BiPAP      FIO2 40 %     Blood Gas, Arterial [[id number]]  (Abnormal) Collected:  02/24/17 0818    Specimen:  Arterial Blood Updated:  02/24/17 0819     Site Arterial: right brachial      Bassam's Test Positive      pH, Arterial 7.419 pH units      pCO2, Arterial 60.7 (H) mm Hg      pO2, Arterial 36.8 (L) mm Hg      HCO3, Arterial 39.3 (H) mmol/L      Base Excess, Arterial 14.8 mmol/L      O2 Saturation, Arterial 70.9 %      Hemoglobin, Blood Gas 11.3 (L) g/dL      Barometric Pressure for Blood Gas 728 mmHg      Modality BiPAP      FIO2 40 %          Imaging Results (most recent)     Procedure Component Value Units Date/Time    XR Chest 1 View [[id number]] Collected:  02/18/17 0830     Updated:  02/18/17 0834    Narrative:       PROCEDURE: XR CHEST 1 VW-     HISTORY: Pneumonia.     COMPARISON: None.      FINDINGS:    . The heart is normal in size. The mediastinum is  unremarkable. The lungs are hyperinflated consistent with COPD. There  are mild right lung base opacities which may represent pneumonia or  atelectasis.. There is no pneumothorax. The bony thorax in intact.           Impression:       COPD.     Right lung base pneumonia or atelectasis..        This report was finalized on 2/18/2017 8:32 AM by Nestor Cardenas MD.    XR Chest 1 View [[id number]] Collected:  02/21/17 0837     Updated:  02/21/17 0916    Narrative:       PORTABLE CHEST X-RAY     CLINICAL HISTORY: Pneumonia.     COMPARISON: 02/18/2017.     FINDINGS: Portable AP view of the chest was obtained with overlying  monitor leads in place. Hazy bibasilar opacities have increased  slightly, likely combination of airspace disease and small layering  effusions. Upper lung zones are clear other than some suspected  underlying emphysema and fibrosis. Borderline cardiomegaly. Vascularity  is normal. There are vascular type calculi.       Impression:       Increasing hazy basilar opacities likely airspace disease  and small layering effusions. Follow-up recommended, preferably with 2  view exam.        This report was finalized on 2/21/2017 9:14 AM by Yazan Argueta MD.          Condition on Discharge:  stable    Vital Signs  Temp:  [97.9 °F (36.6 °C)-98.9 °F (37.2 °C)] 97.9 °F (36.6 °C)  Heart Rate:  [68-89] 76  Resp:  [17-25] 17  BP: (145-160)/(78-90) 149/90    Physical Exam:  Gen: Alert, appropriate, pleasant and interactive  HEENT: EOMI, ATNC, MMM  Neck: Supple  Heart: S1S2, RRR  Lungs: CTA bilaterally, no wheezes, rales, or rhonchi  Abdomen: Soft, NTND, BS+  Extremities: Warm, well-perfused, +  pulses  Skin: P/W/D  Neuro: A/O x3, speech clear      Discharge Disposition  Home or Self Care    Discharge Medications   Elizabeth Fulton   Home Medication Instructions RODRIGO:[id number]    Printed on:02/26/17 0802   Medication Information                      acetaminophen (TYLENOL) 650 MG 8 hr tablet  Take 650 mg by mouth Every 8 (Eight) Hours As Needed for mild pain (1-3).             ammonium lactate (LAC-HYDRIN) 12 % lotion  Apply  topically 2 (Two) Times a Day As Needed for dry skin.             aspirin 81 MG chewable tablet  Chew 81 mg Daily.             atenolol (TENORMIN) 50 MG tablet  Take 50 mg by mouth 2 (Two) Times a Day. Takes at morning and bedtime             azithromycin (ZITHROMAX) 500 MG tablet  Take 1 tablet by mouth Daily.             benzonatate (TESSALON) 100 MG capsule  Take 100 mg by mouth 3 (Three) Times a Day As Needed for cough.             chlordiazePOXIDE (LIBRIUM) 5 MG capsule  Take 2 capsules by mouth 3 (Three) Times a Day. Sig 2-3 cap tid not to exceed 7 capsules/d             diltiaZEM CD (CARDIZEM CD) 240 MG 24 hr capsule  Take 1 capsule by mouth Daily.             escitalopram (LEXAPRO) 20 MG tablet  Take 20 mg by mouth Daily.             fluticasone (FLONASE) 50 MCG/ACT nasal spray  2 sprays into each nostril Daily.             ipratropium-albuterol (DUO-NEB) 0.5-2.5 mg/mL nebulizer  Take 3 mL by nebulization Every 4 (Four) Hours As Needed for wheezing.             isosorbide mononitrate (IMDUR) 60 MG 24 hr tablet  Take 60 mg by mouth Daily. Is out of this med needs refill             lisinopril (PRINIVIL,ZESTRIL) 10 MG tablet  Take 10 mg by mouth Daily. Noon             nicotine (NICODERM CQ) 14 MG/24HR patch  Place 1 patch on the skin Daily.             omeprazole (priLOSEC) 20 MG capsule  Take 20 mg by mouth Daily.             pravastatin (PRAVACHOL) 40 MG tablet  Take 40 mg by mouth Every Night.                 Discharge Diet:   Diet Instructions     Advance Diet As  Tolerated                     Activity at Discharge:   Activity Instructions     Activity as Tolerated                     Follow-up Appointments  No future appointments.  Additional Instructions for the Follow-ups that You Need to Schedule     Discharge Follow-Up With Specified Provider    As directed    To:  Sarwat in 2 weeks       Discharge Follow-up with PCP    As directed    Follow Up Details:  Tuesday 2/28                      Sal Spann MD  02/26/17  4:59 PM    Time: 45 minutes

## 2017-02-27 RX ORDER — BENZONATATE 100 MG/1
100 CAPSULE ORAL 3 TIMES DAILY PRN
Qty: 21 CAPSULE | Refills: 0 | Status: SHIPPED | OUTPATIENT
Start: 2017-02-27 | End: 2017-03-06

## 2017-02-27 RX ORDER — CHLORDIAZEPOXIDE HYDROCHLORIDE 5 MG/1
10 CAPSULE, GELATIN COATED ORAL 3 TIMES DAILY
Qty: 90 CAPSULE | Refills: 1 | Status: SHIPPED | OUTPATIENT
Start: 2017-02-27 | End: 2017-03-06

## 2017-02-27 RX ORDER — ISOSORBIDE MONONITRATE 60 MG/1
60 TABLET, EXTENDED RELEASE ORAL DAILY
Qty: 30 TABLET | Refills: 0 | Status: SHIPPED | OUTPATIENT
Start: 2017-02-27

## 2017-02-27 NOTE — PROGRESS NOTES
Continued Stay Note  MANJULA Roberson     Patient Name: Elizabeth Fulton  MRN: [id number]  Today's Date: 2/27/2017    Admit Date: 2/17/2017          Discharge Plan       02/27/17 0831    Case Management/Social Work Plan    Plan 2/26/17 Discharged Home               Discharge Codes       02/27/17 0832    Discharge Codes    Discharge Codes 01  Discharge to home        Expected Discharge Date and Time     Expected Discharge Date Expected Discharge Time    Feb 26, 2017             Yesy Nielsen

## 2017-09-12 ENCOUNTER — LAB REQUISITION (OUTPATIENT)
Dept: LAB | Facility: HOSPITAL | Age: 67
End: 2017-09-12

## 2017-09-12 DIAGNOSIS — J96.21 ACUTE AND CHRONIC RESPIRATORY FAILURE WITH HYPOXIA (HCC): ICD-10-CM

## 2017-09-12 LAB
ANION GAP SERPL CALCULATED.3IONS-SCNC: 9.7 MMOL/L
BUN BLD-MCNC: 12 MG/DL (ref 7–20)
BUN/CREAT SERPL: 17.1 (ref 7.1–23.5)
CALCIUM SPEC-SCNC: 9.6 MG/DL (ref 8.4–10.2)
CHLORIDE SERPL-SCNC: 98 MMOL/L (ref 98–107)
CO2 SERPL-SCNC: 37 MMOL/L (ref 26–30)
CREAT BLD-MCNC: 0.7 MG/DL (ref 0.6–1.3)
GFR SERPL CREATININE-BSD FRML MDRD: 83 ML/MIN/1.73
GLUCOSE BLD-MCNC: 105 MG/DL (ref 74–98)
POTASSIUM BLD-SCNC: 3.7 MMOL/L (ref 3.5–5.1)
SODIUM BLD-SCNC: 141 MMOL/L (ref 137–145)
TSH SERPL DL<=0.05 MIU/L-ACNC: <0.015 MIU/ML (ref 0.47–4.68)

## 2017-09-12 PROCEDURE — 80048 BASIC METABOLIC PNL TOTAL CA: CPT | Performed by: INTERNAL MEDICINE

## 2017-09-12 PROCEDURE — 84443 ASSAY THYROID STIM HORMONE: CPT | Performed by: INTERNAL MEDICINE

## 2017-09-14 ENCOUNTER — LAB REQUISITION (OUTPATIENT)
Dept: LAB | Facility: HOSPITAL | Age: 67
End: 2017-09-14

## 2017-09-14 DIAGNOSIS — J44.1 CHRONIC OBSTRUCTIVE PULMONARY DISEASE WITH ACUTE EXACERBATION (HCC): ICD-10-CM

## 2017-09-14 LAB — T4 FREE SERPL-MCNC: 1.41 NG/DL (ref 0.78–2.19)

## 2017-09-14 PROCEDURE — 84439 ASSAY OF FREE THYROXINE: CPT | Performed by: INTERNAL MEDICINE
